# Patient Record
Sex: MALE | Race: WHITE | NOT HISPANIC OR LATINO | Employment: FULL TIME | URBAN - METROPOLITAN AREA
[De-identification: names, ages, dates, MRNs, and addresses within clinical notes are randomized per-mention and may not be internally consistent; named-entity substitution may affect disease eponyms.]

---

## 2017-01-03 DIAGNOSIS — F41.9 ANXIETY: Primary | ICD-10-CM

## 2017-01-03 DIAGNOSIS — F33.1 MAJOR DEPRESSIVE DISORDER, RECURRENT EPISODE, MODERATE (H): ICD-10-CM

## 2017-01-03 RX ORDER — BUPROPION HYDROCHLORIDE 150 MG/1
150 TABLET, EXTENDED RELEASE ORAL 3 TIMES DAILY
Qty: 90 TABLET | Refills: 0 | Status: SHIPPED | OUTPATIENT
Start: 2017-01-03 | End: 2017-01-05

## 2017-01-03 NOTE — TELEPHONE ENCOUNTER
Wellbutrin SR 150mg       Last Written Prescription Date: 5/5/16  Last Fill Quantity: 270; # refills: 0  Last Office Visit with [facility], P or [facility] prescribing provider:  6/26/15        Last PHQ-9 score on record=   PHQ-9 SCORE 3/5/2015   Total Score 1       No results found for this basename: ast  No results found for this basename: alt      Thank You-  Barbara Harris, [facility] [place]

## 2017-01-05 ENCOUNTER — OFFICE VISIT (OUTPATIENT)
Dept: FAMILY MEDICINE | Facility: CLINIC | Age: 32
End: 2017-01-05
Payer: COMMERCIAL

## 2017-01-05 VITALS
BODY MASS INDEX: 28.99 KG/M2 | WEIGHT: 225.9 LBS | DIASTOLIC BLOOD PRESSURE: 88 MMHG | SYSTOLIC BLOOD PRESSURE: 126 MMHG | HEART RATE: 84 BPM

## 2017-01-05 DIAGNOSIS — R07.0 THROAT PAIN: ICD-10-CM

## 2017-01-05 DIAGNOSIS — F33.1 MAJOR DEPRESSIVE DISORDER, RECURRENT EPISODE, MODERATE (H): Primary | ICD-10-CM

## 2017-01-05 DIAGNOSIS — F41.9 ANXIETY: ICD-10-CM

## 2017-01-05 DIAGNOSIS — F98.8 ADD (ATTENTION DEFICIT DISORDER): ICD-10-CM

## 2017-01-05 LAB
DEPRECATED S PYO AG THROAT QL EIA: NORMAL
MICRO REPORT STATUS: NORMAL
SPECIMEN SOURCE: NORMAL

## 2017-01-05 PROCEDURE — 87081 CULTURE SCREEN ONLY: CPT | Mod: 90 | Performed by: FAMILY MEDICINE

## 2017-01-05 PROCEDURE — 87880 STREP A ASSAY W/OPTIC: CPT | Performed by: FAMILY MEDICINE

## 2017-01-05 PROCEDURE — 99000 SPECIMEN HANDLING OFFICE-LAB: CPT | Performed by: FAMILY MEDICINE

## 2017-01-05 PROCEDURE — 99214 OFFICE O/P EST MOD 30 MIN: CPT | Performed by: FAMILY MEDICINE

## 2017-01-05 RX ORDER — DEXTROAMPHETAMINE SACCHARATE, AMPHETAMINE ASPARTATE MONOHYDRATE, DEXTROAMPHETAMINE SULFATE AND AMPHETAMINE SULFATE 5; 5; 5; 5 MG/1; MG/1; MG/1; MG/1
20 CAPSULE, EXTENDED RELEASE ORAL DAILY
Qty: 2 CAPSULE | Refills: 0 | Status: SHIPPED | OUTPATIENT
Start: 2017-01-05 | End: 2018-07-20

## 2017-01-05 RX ORDER — ESCITALOPRAM OXALATE 10 MG/1
10 TABLET ORAL DAILY
Qty: 30 TABLET | Refills: 5 | Status: SHIPPED | OUTPATIENT
Start: 2017-01-05 | End: 2017-10-02

## 2017-01-05 RX ORDER — BUPROPION HYDROCHLORIDE 150 MG/1
150 TABLET, EXTENDED RELEASE ORAL 3 TIMES DAILY
Qty: 90 TABLET | Refills: 5 | Status: SHIPPED | OUTPATIENT
Start: 2017-01-05 | End: 2017-10-02

## 2017-01-05 ASSESSMENT — ANXIETY QUESTIONNAIRES
2. NOT BEING ABLE TO STOP OR CONTROL WORRYING: NOT AT ALL
5. BEING SO RESTLESS THAT IT IS HARD TO SIT STILL: NOT AT ALL
1. FEELING NERVOUS, ANXIOUS, OR ON EDGE: NOT AT ALL
GAD7 TOTAL SCORE: 1
3. WORRYING TOO MUCH ABOUT DIFFERENT THINGS: SEVERAL DAYS
IF YOU CHECKED OFF ANY PROBLEMS ON THIS QUESTIONNAIRE, HOW DIFFICULT HAVE THESE PROBLEMS MADE IT FOR YOU TO DO YOUR WORK, TAKE CARE OF THINGS AT HOME, OR GET ALONG WITH OTHER PEOPLE: NOT DIFFICULT AT ALL
7. FEELING AFRAID AS IF SOMETHING AWFUL MIGHT HAPPEN: NOT AT ALL
6. BECOMING EASILY ANNOYED OR IRRITABLE: NOT AT ALL

## 2017-01-05 ASSESSMENT — PATIENT HEALTH QUESTIONNAIRE - PHQ9: 5. POOR APPETITE OR OVEREATING: NOT AT ALL

## 2017-01-05 NOTE — MR AVS SNAPSHOT
"              After Visit Summary   2017    Evangelist Macario    MRN: [id number]           Patient Information     Date Of Birth          1985        Visit Information        Provider Department      2017 3:20 PM Josefina, KIM Pardo MD [facility]        Today's Diagnoses     Moderate Depression [296.32]    -  1     ADD (attention deficit disorder)         Anxiety         Throat pain            Follow-ups after your visit        Who to contact     If you have questions or need follow up information about today's clinic visit or your schedule please contact [facility] directly at [phone].  Normal or non-critical lab and imaging results will be communicated to you by MyChart, letter or phone within 4 business days after the clinic has received the results. If you do not hear from us within 7 days, please contact the clinic through [employer] or phone. If you have a critical or abnormal lab result, we will notify you by phone as soon as possible.  Submit refill requests through [employer] or call your pharmacy and they will forward the refill request to us. Please allow 3 business days for your refill to be completed.          Additional Information About Your Visit        MyChart Information     [employer] lets you send messages to your doctor, view your test results, renew your prescriptions, schedule appointments and more. To sign up, go to [email or web address] . Click on \"Log in\" on the left side of the screen, which will take you to the Welcome page. Then click on \"Sign up Now\" on the right side of the page.     You will be asked to enter the access code listed below, as well as some personal information. Please follow the directions to create your username and password.     Your access code is: [email or web address]  Expires: 2017  5:31 PM     Your access code will  in 90 days. If you need help or a new code, please call your [facility] or [phone].        Care " EveryWhere ID     This is your Care EveryWhere ID. This could be used by other organizations to access your [facility] medical records  [id number]        Your Vitals Were     Pulse                   84            Blood Pressure from Last 3 Encounters:   01/05/17 126/88   07/05/16 138/84   03/30/16 164/102    Weight from Last 3 Encounters:   01/05/17 225 lb 14.4 oz (102.468 kg)   03/30/16 210 lb (95.255 kg)   06/26/15 198 lb 9.6 oz (90.084 kg)              We Performed the Following     Beta strep group A culture     Rapid strep screen          Today's Medication Changes          These changes are accurate as of: 1/5/17  5:31 PM.  If you have any questions, ask your nurse or doctor.               These medicines have changed or have updated prescriptions.        Dose/Directions    buPROPion 150 MG 12 hr tablet   Commonly known as:  WELLBUTRIN SR   This may have changed:  additional instructions   Used for:  Anxiety, Major depressive disorder, recurrent episode, moderate (H)   Changed by:  KIM Rocha MD        Dose:  150 mg   Take 1 tablet (150 mg) by mouth 3 times daily   Quantity:  90 tablet   Refills:  5       escitalopram 10 MG tablet   Commonly known as:  LEXAPRO   This may have changed:  additional instructions   Used for:  Anxiety, Major depressive disorder, recurrent episode, moderate (H)   Changed by:  KIM Rocha MD        Dose:  10 mg   Take 1 tablet (10 mg) by mouth daily   Quantity:  30 tablet   Refills:  5            Where to get your medicines      These medications were sent to [facility] [place], [place] - [address]   [address] [facility] [address]     Phone:  [phone]    - buPROPion 150 MG 12 hr tablet  - escitalopram 10 MG tablet      Some of these will need a paper prescription and others can be bought over the counter.  Ask your nurse if you have questions.     Bring a paper prescription for each of these medications    -  amphetamine-dextroamphetamine 20 MG per 24 hr capsule             Primary Care Provider Office Phone # Fax #    KIM Rocha [phone]       [address] [address]        Thank you!     Thank you for choosing [facility]  for your care. Our goal is always to provide you with excellent care. Hearing back from our patients is one way we can continue to improve our services. Please take a few minutes to complete the written survey that you may receive in the mail after your visit with us. Thank you!             Your Updated Medication List - Protect others around you: Learn how to safely use, store and throw away your medicines at www.disposemymeds.org.          This list is accurate as of: 1/5/17  5:31 PM.  Always use your most recent med list.                   Brand Name Dispense Instructions for use    ALPRAZolam 0.25 MG tablet    XANAX    15 tablet    Take 1 tablet (0.25 mg) by mouth 3 times daily as needed for anxiety       amphetamine-dextroamphetamine 20 MG per 24 hr capsule    ADDERALL XR    2 capsule    Take 1 capsule (20 mg) by mouth daily On the day of your exam       buPROPion 150 MG 12 hr tablet    WELLBUTRIN SR    90 tablet    Take 1 tablet (150 mg) by mouth 3 times daily       escitalopram 10 MG tablet    LEXAPRO    30 tablet    Take 1 tablet (10 mg) by mouth daily

## 2017-01-05 NOTE — PROGRESS NOTES
SUBJECTIVE:  Evangelist Macario is a 31 year old male who presents with the following concerns;              Symptoms: cc Present Absent Comment   Fever/Chills  X     Fatigue  X     Muscle Aches   X    Eye Irritation   X    Sneezing  X     Nasal Kamar/Drg  X     Sinus Pressure/Pain  X     Loss of smell   X    Dental pain   X    Sore Throat x X   this feels similar to when he has had strep throat before    Swollen Glands   X    Ear Pain/Fullness  X     Cough  X     Wheeze   X    Chest Pain  X  DUE TO COUGHING,SNEEZING    Shortness of breath   X    Rash   X    Other         Symptom duration:  X 4 DAYS   Sympom severity:  NOT GETTING ANY BETTER   Treatments tried:  NONE   Contacts:  DAUGHTERS     Depression follow-up: The medications are working well and he is interested in continuing on the medication the same. Current symptoms include:  PHQ-9 ([employer]) 1/5/2017   No Interest In Doing Things    Feeling Depressed    Trouble Sleeping    Tired / No Energy    No appetite or Over-Eating    Feeling Bad about Self    Trouble Concentrating    Moving Slow or Restless    Suicidal Thoughts    Total Score    1.  Little interest or pleasure in doing things 0   2.  Feeling down, depressed, or hopeless 0   3.  Trouble falling or staying asleep, or sleeping too much 1   4.  Feeling tired or having little energy 1   5.  Poor appetite or overeating 0   6.  Feeling bad about yourself 0   7.  Trouble concentrating 0   8.  Moving slowly or restless 0   9.  Suicidal or self-harm thoughts 0   PHQ-9 Total Score 2   Difficulty at work, home, or with people Not difficult at all     ITA-7   [employer], 2002; Used with Permission) 1/5/2017   Over the last 2 weeks, how often have you been bothered by feeling nervous, anxious or on edge?    Over the last 2 weeks, how often have you been bothered by not being able to stop or control worrying?    Over the last 2 weeks, how often have you been bothered by worrying too much about different things?     Over the last 2 weeks, how often have you been bothered by trouble relaxing?    Over the last 2 weeks, how often have you been bothered by being so restless that it is hard to sit still?    Over the last 2 weeks, how often have you been bothered by becoming easily annoyed or irritable?    Over the last 2 weeks, how often have you been bothered by feeling afraid as if something awful might happen?    ITA-7 Total Score =     1. Feeling nervous, anxious, or on edge 0   2. Not being able to stop or control worrying 0   3. Worrying too much about different things 1   4. Trouble relaxing 0   5. Being so restless that it is hard to sit still 0   6. Becoming easily annoyed or irritable 0   7. Feeling afraid, as if something awful might happen 0   ITA-7 Total Score 1   If you checked any problems, how difficult have they made it for you to do your work, take care of things at home, or get along with other people? Not difficult at all       Attention deficit disorder: He is able to function pretty well with this but has extreme difficulty with test taking. He has an exam coming up and would like to have a refill of just 2 tablets for using with this exam    Medications updated and reviewed.  Past, family and surgical history is updated and reviewed in the record.  ROS:  Other than noted above, general, HEENT, respiratory, cardiac and gastrointestinal systems are negative.  OBJECTIVE:  GENERAL:  Alert, no acute distress  EYES:  PERRL, EOM normal, conjunctiva and lids normal  HEENT: right TM normal, left TM normal, throat/mouth:marked erythema, of the posterior pharynx  NECK:  No adenopathy,masses or thyromegaly.  RESP:  Lungs clear to auscultation.  CV: normal rate, regular rhythm, no murmur or gallop.  PSYCH: mentation appears normal., affect and mood normal  Results for orders placed or performed in visit on 01/05/17   Rapid strep screen   Result Value Ref Range    Specimen Description Throat     Rapid Strep A Screen        NEGATIVE: No Group A streptococcal antigen detected by immunoassay, await   culture report.      Micro Report Status FINAL 01/05/2017         ASSESSMENT:  1. Moderate Depression [296.32]    2. ADD (attention deficit disorder)    3. Anxiety    4. Throat pain        PLAN:  Orders Placed This Encounter     amphetamine-dextroamphetamine (ADDERALL XR) 20 MG per 24 hr capsule     buPROPion (WELLBUTRIN SR) 150 MG 12 hr tablet     escitalopram (LEXAPRO) 10 MG tablet     Symptomatic treatment for the sore throat. Recheck in 6 months

## 2017-01-05 NOTE — NURSING NOTE
"Chief Complaint   Patient presents with     Depression     recheck/refill on medication     URI     4 days.        Initial /88 mmHg  Pulse 84  Wt 225 lb 14.4 oz (102.468 kg) Estimated body mass index is 28.99 kg/(m^2) as calculated from the following:    Height as of 6/26/15: 6' 2\" (1.88 m).    Weight as of this encounter: 225 lb 14.4 oz (102.468 kg).  BP completed using cuff size: luis felipe Figueroa, CMA      "

## 2017-01-06 ENCOUNTER — TELEPHONE (OUTPATIENT)
Dept: FAMILY MEDICINE | Facility: CLINIC | Age: 32
End: 2017-01-06

## 2017-01-06 DIAGNOSIS — F41.9 ANXIETY: Primary | ICD-10-CM

## 2017-01-06 RX ORDER — ALPRAZOLAM 0.25 MG
0.25 TABLET ORAL 3 TIMES DAILY PRN
Qty: 15 TABLET | Refills: 5 | Status: SHIPPED | OUTPATIENT
Start: 2017-01-06 | End: 2017-03-28

## 2017-01-06 ASSESSMENT — PATIENT HEALTH QUESTIONNAIRE - PHQ9: SUM OF ALL RESPONSES TO PHQ QUESTIONS 1-9: 2

## 2017-01-06 ASSESSMENT — ANXIETY QUESTIONNAIRES: GAD7 TOTAL SCORE: 1

## 2017-01-06 NOTE — TELEPHONE ENCOUNTER
Xanax      Last Written Prescription Date:  3/5/2015   Last Fill Quantity: 15,   # refills: 2  Last Office Visit with [facility], [facility] or  Health prescribing provider: 1/5/2017  Future Office visit:       Routing refill request to provider for review/approval because:  Drug not on the [facility], [facility] or  Health refill protocol or controlled substance    Pt was seen yesterday and this medication was not filled.  He is wondering if Dr. Rocha want to fill this for him?

## 2017-01-06 NOTE — TELEPHONE ENCOUNTER
Dr. Rocha,    Patient seen 1/5/17 in office.  Please advise on Xanax refill. Klaudia SOUSA RN    Routing refill request to provider for review/approval because:  Drug not on the [facility] refill protocol Controlled substance

## 2017-01-07 LAB
BACTERIA SPEC CULT: NORMAL
MICRO REPORT STATUS: NORMAL
SPECIMEN SOURCE: NORMAL

## 2017-01-17 ENCOUNTER — ALLIED HEALTH/NURSE VISIT (OUTPATIENT)
Dept: FAMILY MEDICINE | Facility: CLINIC | Age: 32
End: 2017-01-17
Payer: COMMERCIAL

## 2017-01-17 DIAGNOSIS — Z23 NEED FOR PROPHYLACTIC VACCINATION AND INOCULATION AGAINST INFLUENZA: Primary | ICD-10-CM

## 2017-01-17 PROCEDURE — 90471 IMMUNIZATION ADMIN: CPT

## 2017-01-17 PROCEDURE — 99207 ZZC NO CHARGE NURSE ONLY: CPT

## 2017-01-17 PROCEDURE — 90686 IIV4 VACC NO PRSV 0.5 ML IM: CPT

## 2017-01-17 NOTE — PROGRESS NOTES
Injectable Influenza Immunization Documentation    1.  Is the person to be vaccinated sick today?  No    2. Does the person to be vaccinated have an allergy to eggs or to a component of the vaccine?  No    3. Has the person to be vaccinated today ever had a serious reaction to influenza vaccine in the past?  No    4. Has the person to be vaccinated ever had Guillain-Bancroft syndrome?  No     Form completed by Priscilla Clark MA  4:02 PM 1/17/2017

## 2017-03-28 DIAGNOSIS — F41.9 ANXIETY: ICD-10-CM

## 2017-03-28 RX ORDER — ALPRAZOLAM 0.25 MG
0.25 TABLET ORAL 3 TIMES DAILY PRN
Qty: 15 TABLET | Refills: 5 | Status: SHIPPED | OUTPATIENT
Start: 2017-03-28 | End: 2017-05-15

## 2017-03-28 NOTE — TELEPHONE ENCOUNTER
Alprazolam 0.25mg      Last Written Prescription Date:  1/6/17  Last Fill Quantity: 15,   # refills: 5  Last Office Visit with [facility], [facility] or [facility] prescribing provider: 1/5/17  Future Office visit:       Routing refill request to provider for review/approval because:  Drug not on the [facility], [facility] or [facility] refill protocol or controlled substance    Thank You-  Barbara Harris [facility] [place]

## 2017-05-15 DIAGNOSIS — F41.9 ANXIETY: ICD-10-CM

## 2017-05-15 RX ORDER — ALPRAZOLAM 0.25 MG
0.25 TABLET ORAL 3 TIMES DAILY PRN
Qty: 15 TABLET | Refills: 5 | Status: SHIPPED | OUTPATIENT
Start: 2017-05-15 | End: 2017-07-24

## 2017-05-15 NOTE — TELEPHONE ENCOUNTER
Alprazolam 0.25 mg tablet      Last Written Prescription Date:  3/28/17   Last Fill Quantity: 15,   # refills: 5  Last Office Visit with [facility], [facility] or  Health prescribing provider: 1/5/17  Future Office visit:       Routing refill request to provider for review/approval because:  Drug not on the [facility], [facility] or [facility] refill protocol or controlled substance    Alyssa Spicer, PharmD   [facility]  [phone]

## 2017-07-17 ENCOUNTER — OFFICE VISIT (OUTPATIENT)
Dept: FAMILY MEDICINE | Facility: CLINIC | Age: 32
End: 2017-07-17
Payer: COMMERCIAL

## 2017-07-17 VITALS
TEMPERATURE: 97.9 F | HEART RATE: 96 BPM | SYSTOLIC BLOOD PRESSURE: 136 MMHG | WEIGHT: 217 LBS | BODY MASS INDEX: 28.76 KG/M2 | HEIGHT: 73 IN | DIASTOLIC BLOOD PRESSURE: 86 MMHG

## 2017-07-17 DIAGNOSIS — T63.441A BEE STING REACTION, ACCIDENTAL OR UNINTENTIONAL, INITIAL ENCOUNTER: Primary | ICD-10-CM

## 2017-07-17 PROCEDURE — 99213 OFFICE O/P EST LOW 20 MIN: CPT | Performed by: NURSE PRACTITIONER

## 2017-07-17 RX ORDER — PREDNISONE 20 MG/1
20 TABLET ORAL DAILY
Qty: 5 TABLET | Refills: 0 | Status: SHIPPED | OUTPATIENT
Start: 2017-07-17 | End: 2017-10-02

## 2017-07-17 NOTE — NURSING NOTE
"Chief Complaint   Patient presents with     Insect Bites     Bee sting to the right hand yesterday.       Initial BP (!) 143/94  Pulse 96  Temp 97.9  F (36.6  C) (Tympanic)  Ht 6' 1.25\" (1.861 m)  Wt 217 lb (98.4 kg)  BMI 28.43 kg/m2 Estimated body mass index is 28.43 kg/(m^2) as calculated from the following:    Height as of this encounter: 6' 1.25\" (1.861 m).    Weight as of this encounter: 217 lb (98.4 kg).  Medication Reconciliation: complete  "

## 2017-07-17 NOTE — PATIENT INSTRUCTIONS
Thank you for choosing [facility].  You may be receiving a survey in the mail from Grisel Fonseca regarding your visit today.  Please take a few minutes to complete and return the survey to let us know how we are doing.      If you have questions or concerns, please contact us via [employer] or you can contact your care team at [phone].    Our Clinic hours are:  Monday 6:40 am  to 7:00 pm  Tuesday -Friday 6:40 am to 5:00 pm    The [place] outpatient lab hours are:  Monday - Friday 6:10 am to 4:45 pm  Saturdays 7:00 am to 11:00 am  Appointments are required, call [phone]    If you have clinical questions after hours or would like to schedule an appointment,  call the clinic at [phone].

## 2017-07-17 NOTE — MR AVS SNAPSHOT
After Visit Summary   7/17/2017    Evangelist Macario    MRN: [id number]           Patient Information     Date Of Birth          1985        Visit Information        Provider Department      7/17/2017 2:40 PM Chikis Sinha APRN [facility]        Today's Diagnoses     Bee sting reaction, accidental or unintentional, initial encounter    -  1      Care Instructions          Thank you for choosing [facility].  You may be receiving a survey in the mail from Grisel Fonseca regarding your visit today.  Please take a few minutes to complete and return the survey to let us know how we are doing.      If you have questions or concerns, please contact us via [employer] or you can contact your care team at [phone].    Our Clinic hours are:  Monday 6:40 am  to 7:00 pm  Tuesday -Friday 6:40 am to 5:00 pm    The [place] outpatient lab hours are:  Monday - Friday 6:10 am to 4:45 pm  Saturdays 7:00 am to 11:00 am  Appointments are required, call [phone]    If you have clinical questions after hours or would like to schedule an appointment,  call the clinic at [phone].            Follow-ups after your visit        Who to contact     If you have questions or need follow up information about today's clinic visit or your schedule please contact [facility] directly at [phone].  Normal or non-critical lab and imaging results will be communicated to you by [employer], letter or phone within 4 business days after the clinic has received the results. If you do not hear from us within 7 days, please contact the clinic through [employer] or phone. If you have a critical or abnormal lab result, we will notify you by phone as soon as possible.  Submit refill requests through [employer] or call your pharmacy and they will forward the refill request to us. Please allow 3 business days for your refill to be completed.          Additional Information About Your Visit       "  MyChart Information     [employer] lets you send messages to your doctor, view your test results, renew your prescriptions, schedule appointments and more. To sign up, go to [email or web address] . Click on \"Log in\" on the left side of the screen, which will take you to the Welcome page. Then click on \"Sign up Now\" on the right side of the page.     You will be asked to enter the access code listed below, as well as some personal information. Please follow the directions to create your username and password.     Your access code is: [email or web address]  Expires: 10/15/2017  3:38 PM     Your access code will  in 90 days. If you need help or a new code, please call your [place] clinic or [phone].        Care EveryWhere ID     This is your Care EveryWhere ID. This could be used by other organizations to access your [facility] medical records  [id number]        Your Vitals Were     Pulse Temperature Height BMI (Body Mass Index)          96 97.9  F (36.6  C) (Tympanic) 6' 1.25\" (1.861 m) 28.43 kg/m2         Blood Pressure from Last 3 Encounters:   17 136/86   17 126/88   16 138/84    Weight from Last 3 Encounters:   17 217 lb (98.4 kg)   17 225 lb 14.4 oz (102.5 kg)   16 210 lb (95.3 kg)              Today, you had the following     No orders found for display         Today's Medication Changes          These changes are accurate as of: 17  3:38 PM.  If you have any questions, ask your nurse or doctor.               Start taking these medicines.        Dose/Directions    predniSONE 20 MG tablet   Commonly known as:  DELTASONE   Used for:  Bee sting reaction, accidental or unintentional, initial encounter   Started by:  Chikis Sinha APRN CNP        Dose:  20 mg   Take 1 tablet (20 mg) by mouth daily   Quantity:  5 tablet   Refills:  0            Where to get your medicines      These medications were sent to [facility], [place] - " [address] [place] [address]  [address] [place] [address]     Phone:  [phone]     predniSONE 20 MG tablet                Primary Care Provider Office Phone # Fax #    R Edvin Rocha [phone]       [facility] [address] [facility] [address]        Equal Access to Services     ROHANDESEAN MICHA : Hadii aad ku hadasho Soomaali, waaxda luqadaha, qaybta kaalmada adeegyada, waxay idiin hayaan adeeg khcherridai lasrinath ah. So [facility] [phone].    ATENCIÓN: Si habla español, tiene a stevens disposición servicios gratuitos de asistencia lingüística. [facility] [phone].    We comply with applicable federal civil rights laws and [place] laws. We do not discriminate on the basis of race, color, national origin, age, disability sex, sexual orientation or gender identity.            Thank you!     Thank you for choosing [facility]  for your care. Our goal is always to provide you with excellent care. Hearing back from our patients is one way we can continue to improve our services. Please take a few minutes to complete the written survey that you may receive in the mail after your visit with us. Thank you!             Your Updated Medication List - Protect others around you: Learn how to safely use, store and throw away your medicines at www.disposemymeds.org.          This list is accurate as of: 7/17/17  3:38 PM.  Always use your most recent med list.                   Brand Name Dispense Instructions for use Diagnosis    ALPRAZolam 0.25 MG tablet    XANAX    15 tablet    Take 1 tablet (0.25 mg) by mouth 3 times daily as needed for anxiety    Anxiety       amphetamine-dextroamphetamine 20 MG per 24 hr capsule    ADDERALL XR    2 capsule    Take 1 capsule (20 mg) by mouth daily On the day of your exam    ADD (attention deficit disorder)       buPROPion 150 MG 12 hr tablet    WELLBUTRIN SR    90 tablet    Take 1 tablet (150 mg) by mouth 3 times daily    Anxiety, Major  depressive disorder, recurrent episode, moderate (H)       escitalopram 10 MG tablet    LEXAPRO    30 tablet    Take 1 tablet (10 mg) by mouth daily    Anxiety, Major depressive disorder, recurrent episode, moderate (H)       predniSONE 20 MG tablet    DELTASONE    5 tablet    Take 1 tablet (20 mg) by mouth daily    Bee sting reaction, accidental or unintentional, initial encounter

## 2017-07-17 NOTE — PROGRESS NOTES
"  SUBJECTIVE:                                                    Evangelist Macario is a 31 year old male who presents to clinic today for the following health issues:      BEE STING      Duration: Happened yesterday    Description (location/character/radiation): Bee sting to the right hand, ring finger.  Swelling of the finger at first, now radiating to the hand.    Intensity:  Moderate - was more swollen and tight yesterday, seems better today    Accompanying signs and symptoms: From the palm of the hand to the elbow-tight feeling.    No swelling in mouth/tongue.    No throat symptoms.    No shortness of breath.    History (similar episodes/previous evaluation): Has been stung in the past and has not had swelling.    Precipitating or alleviating factors: None    Therapies tried and outcome: Benadryl yesterday.           Problem list and histories reviewed & adjusted, as indicated.  Additional history: as documented      Reviewed and updated as needed this visit by clinical staff  Tobacco  Allergies  Med Hx  Surg Hx  Fam Hx  Soc Hx      Reviewed and updated as needed this visit by Provider         ROS:  Constitutional, HEENT, cardiovascular, pulmonary, gi and gu systems are negative, except as otherwise noted.    OBJECTIVE:     /86  Pulse 96  Temp 97.9  F (36.6  C) (Tympanic)  Ht 6' 1.25\" (1.861 m)  Wt 217 lb (98.4 kg)  BMI 28.43 kg/m2  Body mass index is 28.43 kg/(m^2).  GENERAL: healthy, alert and no distress  MS: right upper extremity - minimally edema back of hand. No erythema. No tenderness to touch. ROM normal. Sensation intact.      ASSESSMENT/PLAN:       ICD-10-CM    1. Bee sting reaction, accidental or unintentional, initial encounter T63.441A predniSONE (DELTASONE) 20 MG tablet     Mild reaction.  Patient reports more swelling yesterday.  Today mostly concerned about tingling feeling in forearm and elbow.    The risks, benefits and treatment options of prescribed medications or other " treatments have been discussed with the patient. The patient verbalized their understanding and should call or follow up if no improvement or if they develop further problems.    CHESTER Renteria [facility]

## 2017-07-24 DIAGNOSIS — F41.9 ANXIETY: ICD-10-CM

## 2017-07-24 NOTE — TELEPHONE ENCOUNTER
Xanax 0.25mg      Last Written Prescription Date: 5/15/17  Last Fill Quantity: 15,  # refills: 5   Last Office Visit with G, P or [facility] prescribing provider: 1/5/17    Annamaria Jauregui [facility]

## 2017-07-25 RX ORDER — ALPRAZOLAM 0.25 MG
0.25 TABLET ORAL 3 TIMES DAILY PRN
Qty: 15 TABLET | Refills: 5 | Status: SHIPPED | OUTPATIENT
Start: 2017-07-25 | End: 2017-09-14

## 2017-08-29 ENCOUNTER — OFFICE VISIT (OUTPATIENT)
Dept: URGENT CARE | Facility: URGENT CARE | Age: 32
End: 2017-08-29
Payer: COMMERCIAL

## 2017-08-29 VITALS
DIASTOLIC BLOOD PRESSURE: 96 MMHG | HEART RATE: 91 BPM | SYSTOLIC BLOOD PRESSURE: 151 MMHG | BODY MASS INDEX: 28.72 KG/M2 | TEMPERATURE: 97.9 F | WEIGHT: 219.2 LBS | OXYGEN SATURATION: 98 %

## 2017-08-29 DIAGNOSIS — M62.830 SPASM OF BACK MUSCLES: ICD-10-CM

## 2017-08-29 DIAGNOSIS — S33.5XXA SPRAIN OF LUMBAR REGION, INITIAL ENCOUNTER: Primary | ICD-10-CM

## 2017-08-29 PROCEDURE — 99214 OFFICE O/P EST MOD 30 MIN: CPT | Performed by: NURSE PRACTITIONER

## 2017-08-29 RX ORDER — HYDROCODONE BITARTRATE AND ACETAMINOPHEN 5; 325 MG/1; MG/1
1 TABLET ORAL EVERY 6 HOURS PRN
Qty: 20 TABLET | Refills: 0 | Status: SHIPPED | OUTPATIENT
Start: 2017-08-29 | End: 2017-10-02

## 2017-08-29 RX ORDER — CYCLOBENZAPRINE HCL 10 MG
5-10 TABLET ORAL 3 TIMES DAILY PRN
Qty: 30 TABLET | Refills: 1 | Status: SHIPPED | OUTPATIENT
Start: 2017-08-29 | End: 2017-10-02

## 2017-08-29 NOTE — LETTER
[facility]  [address] [address]  Phone: [phone]  Fax: [phone]    August 29, 2017        Evangelist Macario  [address] [facility] [address]          To whom it may concern:    RE: Evangelist Macario    Patient was seen and treated today at our clinic.  Patient may return to work  with the following:  Light duty-unable to lift more than 10   pounds for the next 2 weeks.  If need further work restrictions will need to be re evaluated.     Please contact me for questions or concerns.      Sincerely,        CHESTER Tristan CNP

## 2017-08-29 NOTE — MR AVS SNAPSHOT
After Visit Summary   8/29/2017    Evangelist Macario    MRN: [id number]           Patient Information     Date Of Birth          1985        Visit Information        Provider Department      8/29/2017 5:20 PM Lauren Gilliam APRN CNP [facility]        Today's Diagnoses     Sprain of lumbar region, initial encounter    -  1    Spasm of back muscles          Care Instructions    Heat or ice may help.   Ibuprofen up to 800 mg four times daily, aleve 2 pills twice daily or acetaminophen 2 pills four times daily may help.   Stronger pain pills can be used if needed. Take muscle relaxant, Flexeril (cyclobenzaprine) up to 3 times a day as needed for pain.   OK to take the Take hydrocodone/acetaminophen 1 or 2 pills every 6 hours as needed for pain.   Recommend physical therapy if not improving.  Will need to follow-up with your primary care provider for a referral if not improving,   Recheck in 2-3 weeks if not improving or other problems.   Staying active will help. Those who continue work and daily activities get better faster. Avoid painful activities.           Back Sprain or Strain    Injury to the muscles (strain) or ligaments (sprain) around the spine can be troubling. Injury may occur after a sudden forceful twisting or bending force such as in a car accident, after a simple awkward movement, or after lifting something heavy with poor body positioning. In any case, muscle spasm is often present and adds to the pain.  Thankfully, most people feel better in 1 to 2 weeks, and most of the rest in 1 to 2 months. Most people can remain active. Unless you had a forceful or traumatic physical injury such as a car accident or fall, X-rays may not be ordered for the first evaluation of a back sprain or strain. If pain continues and does not respond to medical treatment, your healthcare provider may then order X-rays and other tests.  Home care  The following guidelines  will help you care for your injury at home:    When in bed, try to find a comfortable position. A firm mattress is best. Try lying flat on your back with pillows under your knees. You can also try lying on your side with your knees bent up toward your chest and a pillow between your knees.    Don't sit for long periods. Try not to take long car rides or take other trips that have you sitting for a long time. This puts more stress on the lower back than standing or walking.    During the first 24 to 72 hours after an injury or flare-up, apply an ice pack to the painful area for 20 minutes. Then remove it for 20 minutes. Do this for 60 to 90 minutes, or several times a day. This will reduce swelling and pain. Be sure to wrap the ice pack in a thin towel or plastic to protect your skin.    You can start with ice, then switch to heat. Heat from a hot shower, hot bath, or heating pad reduces pain and works well for muscle spasms. Put heat on the painful area for 20 minutes, then remove for 20 minutes. Do this for 60 to 90 minutes, or several times a day. Do not use a heating pad while sleeping. It can burn the skin.    You can alternate the ice and heat. Talk with your healthcare provider to find out the best treatment or therapy for your back pain.    Therapeutic massage will help relax the back muscles without stretching them.    Be aware of safe lifting methods. Do not lift anything over 15 pounds until all of the pain is gone.  Medicines  Talk to your healthcare provider before using medicines, especially if you have other health problems or are taking other medicines.    You may use acetaminophen or ibuprofen to control pain, unless another pain medicine was prescribed. If you have chronic conditions like diabetes, liver or kidney disease, stomach ulcers, or gastrointestinal bleeding, or are taking blood-thinner medicines, talk with your doctor before taking any medicines.    Be careful if you are given prescription  medicines, narcotics, or medicine for muscle spasm. They can cause drowsiness, and affect your coordination, reflexes, and judgment. Do not drive or operate heavy machinery when taking these types of medicines. Only take pain medicine as prescribed by your healthcare provider.  Follow-up care  Follow up with your healthcare provider, or as advised. You may need physical therapy or more tests if your symptoms get worse.  If you had X-rays your healthcare provider may be checking for any broken bones, breaks, or fractures. Bruises and sprains can sometimes hurt as much as a fracture. These injuries can take time to heal completely. If your symptoms don t improve or they get worse, talk with your healthcare provider. You may need a repeat X-ray or other tests.  Call 911  Call for emergency care if any of the following occur:    Trouble breathing    Confused    Very drowsy or trouble awakening    Fainting or loss of consciousness    Rapid or very slow heart rate    Loss of bowel or bladder control  When to seek medical advice  Call your healthcare provider right away if any of the following occur:    Pain gets worse or spreads to your arms or legs    Weakness or numbness in one or both arms or legs    Numbness in the groin or genital area  Date Last Reviewed: 6/1/2016 2000-2017 [employer]. [address] [place] [address]. All rights reserved. This information is not intended as a substitute for professional medical care. Always follow your healthcare professional's instructions.        Back Spasm (No Trauma)    Spasm of the back muscles can occur after a sudden forceful twisting or bending force (such as in a car accident), after a simple awkward movement, or after lifting something heavy with poor body positioning. In any case, muscle spasm adds to the pain. Sleeping in an awkward position or on a poor quality mattress can also cause this. Some people respond to emotional stress by tensing  the muscles of their back.  Pain that continues may need further evaluation or other types of treatment such as physical therapy.  You don't always need X-rays for the initial evaluation of back pain, unless you had a physical injury such as from a car accident or fall. If your pain continues and doesn't respond to medical treatment, X-rays and other tests may then be done.   Home care    As soon as possible, start sitting or walking again to avoid problems from prolonged bed rest (muscle weakness, worsening back stiffness and pain, blood clots in the legs).    When in bed, try to find a position of comfort. A firm mattress is best. Try lying flat on your back with pillows under your knees. You can also try lying on your side with your knees bent up toward your chest and a pillow between your knees.    Avoid prolonged sitting, long car rides, or travel. This puts more stress on the lower back than standing or walking.     During the first 24 to 72 hours after an injury or flare-up, apply an ice pack to the painful area for 20 minutes, then remove it for 20 minutes. Do this over a period of 60 to 90 minutes or several times a day. This will reduce swelling and pain. Always wrap ice packs in a thin towel.    You can start with ice, then switch to heat. Heat (hot shower, hot bath, or heating pad) reduces pain, and works well for muscle spasms. Apply heat to the painful area for 20 minutes, then remove it for 20 minutes. Do this over a period of 60 to 90 minutes or several times a day. Do not sleep on a heating pad as it can burn or damage skin.    Alternate ice and heat therapies.    Be aware of safe lifting methods and do not lift anything over 15 pounds until all the pain is gone.  Gentle stretching will help your back heal faster. Do this simple routine 2 to 3 times a day until your back is feeling better.    Lie on your back with your knees bent and both feet on the ground    Slowly raise your left knee to your  chest as you flatten your lower back against the floor. Hold for 20 to 30 seconds.    Relax and repeat the exercise with your right knee.    Do 2 to 3 of these exercises for each leg.    Repeat, hugging both knees to your chest at the same time.    Do not bounce, but use a gentle pull.  Medicines  Talk to your doctor before using medicine, especially if you have other medical problems or are taking other medicines.  You may use acetaminophen or ibuprofen to control pain, unless your healthcare provider prescribed another pain medicine. If you have a chronic condition such as diabetes, liver or kidney disease, stomach ulcer, or gastrointestinal bleeding, or are taking blood thinners, talk with your healthcare provider before taking any medicines.  Be careful if you are given prescription pain medicine, narcotics, or medicine for muscle spasm. They can cause drowsiness, affect your coordination, reflexes, or judgment. Do not drive or operate heavy machinery when taking these medicines. Take pain medicine only as prescribed by your healthcare provider.  Follow-up care  Follow up with your doctor, or as advised. Physical therapy or further tests may be needed.  If X-rays were taken, they may be reviewed by a radiologist. You will be notified of any new findings that may affect your care.  Call 911  Seek emergency medical care if any of these occur:    Trouble breathing    Confusion    Drowsiness or trouble awakening    Fainting or loss of consciousness    Rapid or very slow heart rate    Loss of bowel or bladder control  When to seek medical advice  Call your healthcare provider right away if any of these occur:    Pain becomes worse or spreads to your legs    Weakness or numbness in one or both legs    Numbness in the groin or genital area    Unexplained fever over 100.4 F (38.0 C)    Burning or pain when passing urine  Date Last Reviewed: 6/1/2016 2000-2017 [employer]. [address] [facility],  "[employer] [place] [address]. All rights reserved. This information is not intended as a substitute for professional medical care. Always follow your healthcare professional's instructions.                Follow-ups after your visit        Who to contact     If you have questions or need follow up information about today's clinic visit or your schedule please contact [facility] directly at [phone].  Normal or non-critical lab and imaging results will be communicated to you by MyChart, letter or phone within 4 business days after the clinic has received the results. If you do not hear from us within 7 days, please contact the clinic through [employer] or phone. If you have a critical or abnormal lab result, we will notify you by phone as soon as possible.  Submit refill requests through [employer] or call your pharmacy and they will forward the refill request to us. Please allow 3 business days for your refill to be completed.          Additional Information About Your Visit        MyChart Information     [employer] lets you send messages to your doctor, view your test results, renew your prescriptions, schedule appointments and more. To sign up, go to [email or web address] . Click on \"Log in\" on the left side of the screen, which will take you to the Welcome page. Then click on \"Sign up Now\" on the right side of the page.     You will be asked to enter the access code listed below, as well as some personal information. Please follow the directions to create your username and password.     Your access code is: [email or web address]  Expires: 10/15/2017  3:38 PM     Your access code will  in 90 days. If you need help or a new code, please call your [place] clinic or [phone].        Care EveryWhere ID     This is your Care EveryWhere ID. This could be used by other organizations to access your [place] medical records  [id number]        Your Vitals Were     Pulse Temperature Pulse Oximetry BMI " (Body Mass Index)          91 97.9  F (36.6  C) (Tympanic) 98% 28.72 kg/m2         Blood Pressure from Last 3 Encounters:   08/29/17 (!) 151/96   07/17/17 136/86   01/05/17 126/88    Weight from Last 3 Encounters:   08/29/17 219 lb 3.2 oz (99.4 kg)   07/17/17 217 lb (98.4 kg)   01/05/17 225 lb 14.4 oz (102.5 kg)              Today, you had the following     No orders found for display         Today's Medication Changes          These changes are accurate as of: 8/29/17  5:40 PM.  If you have any questions, ask your nurse or doctor.               Start taking these medicines.        Dose/Directions    cyclobenzaprine 10 MG tablet   Commonly known as:  FLEXERIL   Used for:  Spasm of back muscles   Started by:  Lauren Gilliam APRN CNP        Dose:  5-10 mg   Take 0.5-1 tablets (5-10 mg) by mouth 3 times daily as needed for muscle spasms   Quantity:  30 tablet   Refills:  1       HYDROcodone-acetaminophen 5-325 MG per tablet   Commonly known as:  NORCO   Used for:  Sprain of lumbar region, initial encounter   Started by:  Lauren Gilliam APRN CNP        Dose:  1 tablet   Take 1 tablet by mouth every 6 hours as needed for moderate to severe pain maximum 6 tablet(s) per day   Quantity:  20 tablet   Refills:  0            Where to get your medicines      These medications were sent to [id number] [address] [facility]  [address] [facility] [address]    Hours:  Closed 10-16-08 business to [facility] Phone:  [phone]     cyclobenzaprine 10 MG tablet         Some of these will need a paper prescription and others can be bought over the counter.  Ask your nurse if you have questions.     Bring a paper prescription for each of these medications     HYDROcodone-acetaminophen 5-325 MG per tablet                Primary Care Provider Office Phone # Fax #    KIM Rocha [phone] [address] [facility] [address]        Equal Access to Services      [facility]: Hadii aad ku vito Tirado, waaxda luqadaha, qaybta kaalmada adeegamaury, jessica odilonin hayaajusta galandevyn castrejon luann . So [facility] [phone].    ATENCIÓN: Si habla zaina, tiene a stevens disposición servicios gratuitos de asistencia lingüística. Llame al [phone].    We comply with applicable federal civil rights laws and [place] laws. We do not discriminate on the basis of race, color, national origin, age, disability sex, sexual orientation or gender identity.            Thank you!     Thank you for choosing [facility]  for your care. Our goal is always to provide you with excellent care. Hearing back from our patients is one way we can continue to improve our services. Please take a few minutes to complete the written survey that you may receive in the mail after your visit with us. Thank you!             Your Updated Medication List - Protect others around you: Learn how to safely use, store and throw away your medicines at www.disposemymeds.org.          This list is accurate as of: 8/29/17  5:40 PM.  Always use your most recent med list.                   Brand Name Dispense Instructions for use Diagnosis    ALPRAZolam 0.25 MG tablet    XANAX    15 tablet    Take 1 tablet (0.25 mg) by mouth 3 times daily as needed for anxiety    Anxiety       amphetamine-dextroamphetamine 20 MG per 24 hr capsule    ADDERALL XR    2 capsule    Take 1 capsule (20 mg) by mouth daily On the day of your exam    ADD (attention deficit disorder)       buPROPion 150 MG 12 hr tablet    WELLBUTRIN SR    90 tablet    Take 1 tablet (150 mg) by mouth 3 times daily    Anxiety, Major depressive disorder, recurrent episode, moderate (H)       cyclobenzaprine 10 MG tablet    FLEXERIL    30 tablet    Take 0.5-1 tablets (5-10 mg) by mouth 3 times daily as needed for muscle spasms    Spasm of back muscles       escitalopram 10 MG tablet    LEXAPRO    30 tablet    Take 1 tablet (10 mg) by mouth  daily    Anxiety, Major depressive disorder, recurrent episode, moderate (H)       HYDROcodone-acetaminophen 5-325 MG per tablet    NORCO    20 tablet    Take 1 tablet by mouth every 6 hours as needed for moderate to severe pain maximum 6 tablet(s) per day    Sprain of lumbar region, initial encounter       predniSONE 20 MG tablet    DELTASONE    5 tablet    Take 1 tablet (20 mg) by mouth daily    Bee sting reaction, accidental or unintentional, initial encounter

## 2017-08-29 NOTE — PATIENT INSTRUCTIONS
Heat or ice may help.   Ibuprofen up to 800 mg four times daily, aleve 2 pills twice daily or acetaminophen 2 pills four times daily may help.   Stronger pain pills can be used if needed. Take muscle relaxant, Flexeril (cyclobenzaprine) up to 3 times a day as needed for pain.   OK to take the Take hydrocodone/acetaminophen 1 or 2 pills every 6 hours as needed for pain.   Recommend physical therapy if not improving.  Will need to follow-up with your primary care provider for a referral if not improving,   Recheck in 2-3 weeks if not improving or other problems.   Staying active will help. Those who continue work and daily activities get better faster. Avoid painful activities.           Back Sprain or Strain    Injury to the muscles (strain) or ligaments (sprain) around the spine can be troubling. Injury may occur after a sudden forceful twisting or bending force such as in a car accident, after a simple awkward movement, or after lifting something heavy with poor body positioning. In any case, muscle spasm is often present and adds to the pain.  Thankfully, most people feel better in 1 to 2 weeks, and most of the rest in 1 to 2 months. Most people can remain active. Unless you had a forceful or traumatic physical injury such as a car accident or fall, X-rays may not be ordered for the first evaluation of a back sprain or strain. If pain continues and does not respond to medical treatment, your healthcare provider may then order X-rays and other tests.  Home care  The following guidelines will help you care for your injury at home:    When in bed, try to find a comfortable position. A firm mattress is best. Try lying flat on your back with pillows under your knees. You can also try lying on your side with your knees bent up toward your chest and a pillow between your knees.    Don't sit for long periods. Try not to take long car rides or take other trips that have you sitting for a long time. This puts more stress on  the lower back than standing or walking.    During the first 24 to 72 hours after an injury or flare-up, apply an ice pack to the painful area for 20 minutes. Then remove it for 20 minutes. Do this for 60 to 90 minutes, or several times a day. This will reduce swelling and pain. Be sure to wrap the ice pack in a thin towel or plastic to protect your skin.    You can start with ice, then switch to heat. Heat from a hot shower, hot bath, or heating pad reduces pain and works well for muscle spasms. Put heat on the painful area for 20 minutes, then remove for 20 minutes. Do this for 60 to 90 minutes, or several times a day. Do not use a heating pad while sleeping. It can burn the skin.    You can alternate the ice and heat. Talk with your healthcare provider to find out the best treatment or therapy for your back pain.    Therapeutic massage will help relax the back muscles without stretching them.    Be aware of safe lifting methods. Do not lift anything over 15 pounds until all of the pain is gone.  Medicines  Talk to your healthcare provider before using medicines, especially if you have other health problems or are taking other medicines.    You may use acetaminophen or ibuprofen to control pain, unless another pain medicine was prescribed. If you have chronic conditions like diabetes, liver or kidney disease, stomach ulcers, or gastrointestinal bleeding, or are taking blood-thinner medicines, talk with your doctor before taking any medicines.    Be careful if you are given prescription medicines, narcotics, or medicine for muscle spasm. They can cause drowsiness, and affect your coordination, reflexes, and judgment. Do not drive or operate heavy machinery when taking these types of medicines. Only take pain medicine as prescribed by your healthcare provider.  Follow-up care  Follow up with your healthcare provider, or as advised. You may need physical therapy or more tests if your symptoms get worse.  If you had  X-rays your healthcare provider may be checking for any broken bones, breaks, or fractures. Bruises and sprains can sometimes hurt as much as a fracture. These injuries can take time to heal completely. If your symptoms don t improve or they get worse, talk with your healthcare provider. You may need a repeat X-ray or other tests.  Call 911  Call for emergency care if any of the following occur:    Trouble breathing    Confused    Very drowsy or trouble awakening    Fainting or loss of consciousness    Rapid or very slow heart rate    Loss of bowel or bladder control  When to seek medical advice  Call your healthcare provider right away if any of the following occur:    Pain gets worse or spreads to your arms or legs    Weakness or numbness in one or both arms or legs    Numbness in the groin or genital area  Date Last Reviewed: 6/1/2016 2000-2017 [employer]. [address] [place] [address]. All rights reserved. This information is not intended as a substitute for professional medical care. Always follow your healthcare professional's instructions.        Back Spasm (No Trauma)    Spasm of the back muscles can occur after a sudden forceful twisting or bending force (such as in a car accident), after a simple awkward movement, or after lifting something heavy with poor body positioning. In any case, muscle spasm adds to the pain. Sleeping in an awkward position or on a poor quality mattress can also cause this. Some people respond to emotional stress by tensing the muscles of their back.  Pain that continues may need further evaluation or other types of treatment such as physical therapy.  You don't always need X-rays for the initial evaluation of back pain, unless you had a physical injury such as from a car accident or fall. If your pain continues and doesn't respond to medical treatment, X-rays and other tests may then be done.   Home care    As soon as possible, start sitting or  walking again to avoid problems from prolonged bed rest (muscle weakness, worsening back stiffness and pain, blood clots in the legs).    When in bed, try to find a position of comfort. A firm mattress is best. Try lying flat on your back with pillows under your knees. You can also try lying on your side with your knees bent up toward your chest and a pillow between your knees.    Avoid prolonged sitting, long car rides, or travel. This puts more stress on the lower back than standing or walking.     During the first 24 to 72 hours after an injury or flare-up, apply an ice pack to the painful area for 20 minutes, then remove it for 20 minutes. Do this over a period of 60 to 90 minutes or several times a day. This will reduce swelling and pain. Always wrap ice packs in a thin towel.    You can start with ice, then switch to heat. Heat (hot shower, hot bath, or heating pad) reduces pain, and works well for muscle spasms. Apply heat to the painful area for 20 minutes, then remove it for 20 minutes. Do this over a period of 60 to 90 minutes or several times a day. Do not sleep on a heating pad as it can burn or damage skin.    Alternate ice and heat therapies.    Be aware of safe lifting methods and do not lift anything over 15 pounds until all the pain is gone.  Gentle stretching will help your back heal faster. Do this simple routine 2 to 3 times a day until your back is feeling better.    Lie on your back with your knees bent and both feet on the ground    Slowly raise your left knee to your chest as you flatten your lower back against the floor. Hold for 20 to 30 seconds.    Relax and repeat the exercise with your right knee.    Do 2 to 3 of these exercises for each leg.    Repeat, hugging both knees to your chest at the same time.    Do not bounce, but use a gentle pull.  Medicines  Talk to your doctor before using medicine, especially if you have other medical problems or are taking other medicines.  You may use  acetaminophen or ibuprofen to control pain, unless your healthcare provider prescribed another pain medicine. If you have a chronic condition such as diabetes, liver or kidney disease, stomach ulcer, or gastrointestinal bleeding, or are taking blood thinners, talk with your healthcare provider before taking any medicines.  Be careful if you are given prescription pain medicine, narcotics, or medicine for muscle spasm. They can cause drowsiness, affect your coordination, reflexes, or judgment. Do not drive or operate heavy machinery when taking these medicines. Take pain medicine only as prescribed by your healthcare provider.  Follow-up care  Follow up with your doctor, or as advised. Physical therapy or further tests may be needed.  If X-rays were taken, they may be reviewed by a radiologist. You will be notified of any new findings that may affect your care.  Call 911  Seek emergency medical care if any of these occur:    Trouble breathing    Confusion    Drowsiness or trouble awakening    Fainting or loss of consciousness    Rapid or very slow heart rate    Loss of bowel or bladder control  When to seek medical advice  Call your healthcare provider right away if any of these occur:    Pain becomes worse or spreads to your legs    Weakness or numbness in one or both legs    Numbness in the groin or genital area    Unexplained fever over 100.4 F (38.0 C)    Burning or pain when passing urine  Date Last Reviewed: 6/1/2016 2000-2017 [employer]. [address] [place] [address], [place] [address]. All rights reserved. This information is not intended as a substitute for professional medical care. Always follow your healthcare professional's instructions.

## 2017-08-29 NOTE — PROGRESS NOTES
SUBJECTIVE:  Evangelist Macario is a 32 year old male seen in clinic today for evaluation of back pain.   Symptoms have beening going on for 1 day.    Pain is located in the low back left region, with radiation to radiates into the right buttocks, and are at worst a 7 on a scale of 1-10.  Recent injury:recent heavy lifting  Measures which improve the pain include ice and OTC NSAIDs.  Measures which worsen the pain include standing and walking  Personal hx of back pain is recurrent self limited episodes of low back pain in the past.  There is no history of lower extremity numbness/weakness or change in bowel/bladder control.    History reviewed. No pertinent past medical history.    Social History   Substance Use Topics     Smoking status: Former Smoker     Packs/day: 0.50     Years: 1.00     Types: Cigarettes     Quit date: 5/26/2013     Smokeless tobacco: Never Used     Alcohol use Yes      Comment: once in while       ROS:  CONSTITUTIONAL:NEGATIVE for fever, chills, change in weight  INTEGUMENTARY/SKIN: NEGATIVE for worrisome rashes, moles or lesions  EYES: NEGATIVE for vision changes or irritation  ENT/MOUTH: NEGATIVE for ear, mouth and throat problems  RESP:NEGATIVE for significant cough or SOB  CV: NEGATIVE for chest pain, palpitations or peripheral edema  GI: NEGATIVE for nausea, abdominal pain, heartburn, or change in bowel habits  MUSCULOSKELETAL: See above   NEURO: NEGATIVE for weakness, dizziness or paresthesias      OBJECTIVE:  Blood pressure (!) 151/96, pulse 91, temperature 97.9  F (36.6  C), temperature source Tympanic, weight 219 lb 3.2 oz (99.4 kg), SpO2 98 %.  Back examination: Back symmetric, no curvature. ROM normal. No CVA tenderness.  GENERAL APPEARANCE: healthy, alert and no distress  RESP: lungs clear to auscultation - no rales, rhonchi or wheezes  CV: regular rates and rhythm, normal S1 S2, no murmur noted  NEURO: Normal strength and tone with no weakness or sensory deficit noted, reflexes  normal   SKIN: no suspicious lesions or rashes  Cervical   Inspection: normal cervical lordosis  Non-tender:  occipital nerves, spinous processes, scapula, medial border of scapula, superior angle of scapula, normal musculature, left paracervical muscles, right paracervical muscles, left trapezius muscles, right trapezius muscles  Range of Motion:  Full ROM of cervical spine  Strength: Full strength of all neck muscles      Tender:  lumbar spinous processes, left para lumbar muscles, left SI joint  Non-tender:  thoracic spinous processes, thoracic facet joints, right parathoracic muscles, lumbar spinous processes, lumbar facet joints, right para lumbar muscles, right pars articularis , right SI joint, left sciatic notch, right sciatic notch  Range of Motion:  left lateral thoracic bending   full, right lateral thoracic bending  full, left thoracic rotation  full, right thoracic rotation  full, lumbar flexion  full, lumbar extension  full, left lateral lumbar bending  painful, right lateral lumbar bending  full, left lateral lumbar rotation  painful, right lateral lumbar rotation  full  Strength:  able to heel walk  Special tests:  negative straight leg raises    Hip Exam: Hip ROM full        ICD-10-CM    1. Sprain of lumbar region, initial encounter S33.5XXA HYDROcodone-acetaminophen (NORCO) 5-325 MG per tablet   2. Spasm of back muscles M62.830 cyclobenzaprine (FLEXERIL) 10 MG tablet     Patient Instructions   Heat or ice may help.   Ibuprofen up to 800 mg four times daily, aleve 2 pills twice daily or acetaminophen 2 pills four times daily may help.   Stronger pain pills can be used if needed. Take muscle relaxant, Flexeril (cyclobenzaprine) up to 3 times a day as needed for pain.   OK to take the Take hydrocodone/acetaminophen 1 or 2 pills every 6 hours as needed for pain.   Recommend physical therapy if not improving.  Will need to follow-up with your primary care provider for a referral if not improving,    Recheck in 2-3 weeks if not improving or other problems.   Staying active will help. Those who continue work and daily activities get better faster. Avoid painful activities.           Back Sprain or Strain    Injury to the muscles (strain) or ligaments (sprain) around the spine can be troubling. Injury may occur after a sudden forceful twisting or bending force such as in a car accident, after a simple awkward movement, or after lifting something heavy with poor body positioning. In any case, muscle spasm is often present and adds to the pain.  Thankfully, most people feel better in 1 to 2 weeks, and most of the rest in 1 to 2 months. Most people can remain active. Unless you had a forceful or traumatic physical injury such as a car accident or fall, X-rays may not be ordered for the first evaluation of a back sprain or strain. If pain continues and does not respond to medical treatment, your healthcare provider may then order X-rays and other tests.  Home care  The following guidelines will help you care for your injury at home:    When in bed, try to find a comfortable position. A firm mattress is best. Try lying flat on your back with pillows under your knees. You can also try lying on your side with your knees bent up toward your chest and a pillow between your knees.    Don't sit for long periods. Try not to take long car rides or take other trips that have you sitting for a long time. This puts more stress on the lower back than standing or walking.    During the first 24 to 72 hours after an injury or flare-up, apply an ice pack to the painful area for 20 minutes. Then remove it for 20 minutes. Do this for 60 to 90 minutes, or several times a day. This will reduce swelling and pain. Be sure to wrap the ice pack in a thin towel or plastic to protect your skin.    You can start with ice, then switch to heat. Heat from a hot shower, hot bath, or heating pad reduces pain and works well for muscle spasms. Put  heat on the painful area for 20 minutes, then remove for 20 minutes. Do this for 60 to 90 minutes, or several times a day. Do not use a heating pad while sleeping. It can burn the skin.    You can alternate the ice and heat. Talk with your healthcare provider to find out the best treatment or therapy for your back pain.    Therapeutic massage will help relax the back muscles without stretching them.    Be aware of safe lifting methods. Do not lift anything over 15 pounds until all of the pain is gone.  Medicines  Talk to your healthcare provider before using medicines, especially if you have other health problems or are taking other medicines.    You may use acetaminophen or ibuprofen to control pain, unless another pain medicine was prescribed. If you have chronic conditions like diabetes, liver or kidney disease, stomach ulcers, or gastrointestinal bleeding, or are taking blood-thinner medicines, talk with your doctor before taking any medicines.    Be careful if you are given prescription medicines, narcotics, or medicine for muscle spasm. They can cause drowsiness, and affect your coordination, reflexes, and judgment. Do not drive or operate heavy machinery when taking these types of medicines. Only take pain medicine as prescribed by your healthcare provider.  Follow-up care  Follow up with your healthcare provider, or as advised. You may need physical therapy or more tests if your symptoms get worse.  If you had X-rays your healthcare provider may be checking for any broken bones, breaks, or fractures. Bruises and sprains can sometimes hurt as much as a fracture. These injuries can take time to heal completely. If your symptoms don t improve or they get worse, talk with your healthcare provider. You may need a repeat X-ray or other tests.  Call 911  Call for emergency care if any of the following occur:    Trouble breathing    Confused    Very drowsy or trouble awakening    Fainting or loss of  consciousness    Rapid or very slow heart rate    Loss of bowel or bladder control  When to seek medical advice  Call your healthcare provider right away if any of the following occur:    Pain gets worse or spreads to your arms or legs    Weakness or numbness in one or both arms or legs    Numbness in the groin or genital area  Date Last Reviewed: 6/1/2016 2000-2017 [employer]. [address] [place] [address]. All rights reserved. This information is not intended as a substitute for professional medical care. Always follow your healthcare professional's instructions.        Back Spasm (No Trauma)    Spasm of the back muscles can occur after a sudden forceful twisting or bending force (such as in a car accident), after a simple awkward movement, or after lifting something heavy with poor body positioning. In any case, muscle spasm adds to the pain. Sleeping in an awkward position or on a poor quality mattress can also cause this. Some people respond to emotional stress by tensing the muscles of their back.  Pain that continues may need further evaluation or other types of treatment such as physical therapy.  You don't always need X-rays for the initial evaluation of back pain, unless you had a physical injury such as from a car accident or fall. If your pain continues and doesn't respond to medical treatment, X-rays and other tests may then be done.   Home care    As soon as possible, start sitting or walking again to avoid problems from prolonged bed rest (muscle weakness, worsening back stiffness and pain, blood clots in the legs).    When in bed, try to find a position of comfort. A firm mattress is best. Try lying flat on your back with pillows under your knees. You can also try lying on your side with your knees bent up toward your chest and a pillow between your knees.    Avoid prolonged sitting, long car rides, or travel. This puts more stress on the lower back than standing or  walking.     During the first 24 to 72 hours after an injury or flare-up, apply an ice pack to the painful area for 20 minutes, then remove it for 20 minutes. Do this over a period of 60 to 90 minutes or several times a day. This will reduce swelling and pain. Always wrap ice packs in a thin towel.    You can start with ice, then switch to heat. Heat (hot shower, hot bath, or heating pad) reduces pain, and works well for muscle spasms. Apply heat to the painful area for 20 minutes, then remove it for 20 minutes. Do this over a period of 60 to 90 minutes or several times a day. Do not sleep on a heating pad as it can burn or damage skin.    Alternate ice and heat therapies.    Be aware of safe lifting methods and do not lift anything over 15 pounds until all the pain is gone.  Gentle stretching will help your back heal faster. Do this simple routine 2 to 3 times a day until your back is feeling better.    Lie on your back with your knees bent and both feet on the ground    Slowly raise your left knee to your chest as you flatten your lower back against the floor. Hold for 20 to 30 seconds.    Relax and repeat the exercise with your right knee.    Do 2 to 3 of these exercises for each leg.    Repeat, hugging both knees to your chest at the same time.    Do not bounce, but use a gentle pull.  Medicines  Talk to your doctor before using medicine, especially if you have other medical problems or are taking other medicines.  You may use acetaminophen or ibuprofen to control pain, unless your healthcare provider prescribed another pain medicine. If you have a chronic condition such as diabetes, liver or kidney disease, stomach ulcer, or gastrointestinal bleeding, or are taking blood thinners, talk with your healthcare provider before taking any medicines.  Be careful if you are given prescription pain medicine, narcotics, or medicine for muscle spasm. They can cause drowsiness, affect your coordination, reflexes, or  judgment. Do not drive or operate heavy machinery when taking these medicines. Take pain medicine only as prescribed by your healthcare provider.  Follow-up care  Follow up with your doctor, or as advised. Physical therapy or further tests may be needed.  If X-rays were taken, they may be reviewed by a radiologist. You will be notified of any new findings that may affect your care.  Call 911  Seek emergency medical care if any of these occur:    Trouble breathing    Confusion    Drowsiness or trouble awakening    Fainting or loss of consciousness    Rapid or very slow heart rate    Loss of bowel or bladder control  When to seek medical advice  Call your healthcare provider right away if any of these occur:    Pain becomes worse or spreads to your legs    Weakness or numbness in one or both legs    Numbness in the groin or genital area    Unexplained fever over 100.4 F (38.0 C)    Burning or pain when passing urine  Date Last Reviewed: 6/1/2016 2000-2017 [employer]. [address] [place], [place] [id number]. All rights reserved. This information is not intended as a substitute for professional medical care. Always follow your healthcare professional's instructions.            CHESTER Tristan CNP

## 2017-09-14 DIAGNOSIS — F33.1 MAJOR DEPRESSIVE DISORDER, RECURRENT EPISODE, MODERATE (H): ICD-10-CM

## 2017-09-14 DIAGNOSIS — F41.9 ANXIETY: ICD-10-CM

## 2017-09-14 RX ORDER — ALPRAZOLAM 0.25 MG
0.25 TABLET ORAL 3 TIMES DAILY PRN
Qty: 15 TABLET | Refills: 5 | Status: SHIPPED | OUTPATIENT
Start: 2017-09-14 | End: 2017-11-13

## 2017-09-14 NOTE — TELEPHONE ENCOUNTER
Escitalopram     Last Written Prescription Date: 01/05/2017  Last Fill Quantity: 30, # refills: 5  Last Office Visit with [facility] primary care provider:  07/17/2017        Last PHQ-9 score on record=   PHQ-9 SCORE 1/5/2017   Total Score -   Total Score 2     PHQ-9 SCORE 11/26/2013 3/5/2015 1/5/2017   Total Score 3 1 -   Total Score - - 2     ITA-7 SCORE 11/26/2013 3/5/2015 1/5/2017   Total Score 5 3 -   Total Score - - 1       Josue JAIME (R)

## 2017-09-14 NOTE — TELEPHONE ENCOUNTER
Alprazolam      Last Written Prescription Date: 7/25/17  Last Fill Quantity: 15,  # refills: 5   Last Office Visit with [facility], [facility] or [facility] prescribing provider: 7/17/17    Cierra Rebollar [facility].  [facility]

## 2017-09-19 RX ORDER — ESCITALOPRAM OXALATE 10 MG/1
10 TABLET ORAL DAILY
Qty: 90 TABLET | Refills: 0 | Status: SHIPPED | OUTPATIENT
Start: 2017-09-19 | End: 2017-10-02

## 2017-09-19 NOTE — TELEPHONE ENCOUNTER
Prescription approved per [facility] Refill Protocol. [id number]

## 2017-10-02 ENCOUNTER — OFFICE VISIT (OUTPATIENT)
Dept: FAMILY MEDICINE | Facility: CLINIC | Age: 32
End: 2017-10-02
Payer: COMMERCIAL

## 2017-10-02 VITALS
WEIGHT: 223 LBS | RESPIRATION RATE: 18 BRPM | HEART RATE: 70 BPM | DIASTOLIC BLOOD PRESSURE: 98 MMHG | HEIGHT: 73 IN | BODY MASS INDEX: 29.55 KG/M2 | SYSTOLIC BLOOD PRESSURE: 140 MMHG

## 2017-10-02 DIAGNOSIS — S39.012D STRAIN OF LUMBAR REGION, SUBSEQUENT ENCOUNTER: ICD-10-CM

## 2017-10-02 DIAGNOSIS — F33.1 MAJOR DEPRESSIVE DISORDER, RECURRENT EPISODE, MODERATE (H): ICD-10-CM

## 2017-10-02 DIAGNOSIS — Z23 NEED FOR PROPHYLACTIC VACCINATION AND INOCULATION AGAINST INFLUENZA: ICD-10-CM

## 2017-10-02 DIAGNOSIS — F41.9 ANXIETY: Primary | ICD-10-CM

## 2017-10-02 PROCEDURE — 90686 IIV4 VACC NO PRSV 0.5 ML IM: CPT | Performed by: FAMILY MEDICINE

## 2017-10-02 PROCEDURE — 99214 OFFICE O/P EST MOD 30 MIN: CPT | Mod: 25 | Performed by: FAMILY MEDICINE

## 2017-10-02 PROCEDURE — 90471 IMMUNIZATION ADMIN: CPT | Performed by: FAMILY MEDICINE

## 2017-10-02 RX ORDER — ESCITALOPRAM OXALATE 10 MG/1
10 TABLET ORAL DAILY
Qty: 90 TABLET | Refills: 1 | Status: SHIPPED | OUTPATIENT
Start: 2017-10-02 | End: 2018-07-20

## 2017-10-02 RX ORDER — BUPROPION HYDROCHLORIDE 150 MG/1
TABLET, EXTENDED RELEASE ORAL
Qty: 90 TABLET | Refills: 5 | Status: SHIPPED | OUTPATIENT
Start: 2017-10-02 | End: 2018-07-20

## 2017-10-02 ASSESSMENT — ANXIETY QUESTIONNAIRES
5. BEING SO RESTLESS THAT IT IS HARD TO SIT STILL: NEARLY EVERY DAY
3. WORRYING TOO MUCH ABOUT DIFFERENT THINGS: SEVERAL DAYS
GAD7 TOTAL SCORE: 10
1. FEELING NERVOUS, ANXIOUS, OR ON EDGE: SEVERAL DAYS
7. FEELING AFRAID AS IF SOMETHING AWFUL MIGHT HAPPEN: NOT AT ALL
6. BECOMING EASILY ANNOYED OR IRRITABLE: MORE THAN HALF THE DAYS
2. NOT BEING ABLE TO STOP OR CONTROL WORRYING: SEVERAL DAYS

## 2017-10-02 ASSESSMENT — PATIENT HEALTH QUESTIONNAIRE - PHQ9
SUM OF ALL RESPONSES TO PHQ QUESTIONS 1-9: 8
5. POOR APPETITE OR OVEREATING: MORE THAN HALF THE DAYS

## 2017-10-02 ASSESSMENT — PAIN SCALES - GENERAL: PAINLEVEL: MODERATE PAIN (5)

## 2017-10-02 NOTE — PROGRESS NOTES
Injectable Influenza Immunization Documentation    1.  Is the person to be vaccinated sick today?   No    2. Does the person to be vaccinated have an allergy to a component   of the vaccine?   No    3. Has the person to be vaccinated ever had a serious reaction   to influenza vaccine in the past?   No    4. Has the person to be vaccinated ever had Guillain-Barré syndrome?   No    Form completed by Nabila Patel MA           SUBJECTIVE:   Evangelist Macario is a 32 year old male who presents to clinic today for the following health issues:      Chief Complaint   Patient presents with     Anxiety     Patient is having increase anxiety issues and doesn't feel like the medication is helping. Patient feels like it could be triggered by his back injury. Patient was given flexeril and vicodin through [facility] and he had trouble waking up for work the next day and he was unable to take meds at work so would like to discuss. Patient rates pain 4-5/10.      Flu Shot     Medication Reconciliation     only taking 2 tablets of wellbutrin     At some point his dose of bupropion was increased from 300 mg daily to 450 mg daily, but neither he nor his wife realized that the change been made. They have a  at home which adds to his external stressors as well as dealing with this back injury. Current symptoms include:  PHQ-9 (Pfizer) 10/2/2017   No Interest In Doing Things    Feeling Depressed    Trouble Sleeping    Tired / No Energy    No appetite or Over-Eating    Feeling Bad about Self    Trouble Concentrating    Moving Slow or Restless    Suicidal Thoughts    Total Score    1.  Little interest or pleasure in doing things 1   2.  Feeling down, depressed, or hopeless 1   3.  Trouble falling or staying asleep, or sleeping too much 2   4.  Feeling tired or having little energy 2   5.  Poor appetite or overeating 1   6.  Feeling bad about yourself 1   7.  Trouble concentrating 0   8.  Moving slowly or restless 0   9.   "Suicidal or self-harm thoughts 0   PHQ-9 Total Score 8   Difficulty at work, home, or with people Somewhat difficult     ITA-7   [employer], 2002; Used with Permission) 10/2/2017   Over the last 2 weeks, how often have you been bothered by trouble relaxing?    Over the last 2 weeks, how often have you been bothered by being so restless that it is hard to sit still?    Over the last 2 weeks, how often have you been bothered by becoming easily annoyed or irritable?    Over the last 2 weeks, how often have you been bothered by feeling afraid as if something awful might happen?    ITA-7 Total Score =     1. Feeling nervous, anxious, or on edge 1   2. Not being able to stop or control worrying 1   3. Worrying too much about different things 1   4. Trouble relaxing 2   5. Being so restless that it is hard to sit still 3   6. Becoming easily annoyed or irritable 2   7. Feeling afraid, as if something awful might happen 0   ITA-7 Total Score 10   If you checked any problems, how difficult have they made it for you to do your work, take care of things at home, or get along with other people?        PROBLEMS TO ADD ON...    Problem list and histories reviewed & adjusted, as indicated.  Additional history: as documented        Reviewed and updated as needed this visit by clinical staff  Tobacco  Med Hx  Surg Hx  Fam Hx  Soc Hx      Reviewed and updated as needed this visit by Provider               ROS:  Constitutional, HEENT, cardiovascular, pulmonary, gi and gu systems are negative, except as otherwise noted.          OBJECTIVE:                                                    BP (!) 140/98  Pulse 70  Resp 18  Ht 6' 1.25\" (1.861 m)  Wt 223 lb (101.2 kg)  BMI 29.22 kg/m2    GENERAL: healthy, alert and no distress  EYES: Eyes grossly normal to inspection, extraocular movements - intact, and PERRL  MS: Back exam: Normal appearance and normal range of motion; mild tenderness of the paraspinal muscles at L2 and " extending down towards the buttocks bilaterally  NEURO: strength and tone- normal, sensory exam- grossly normal, mentation- intact, speech- normal, reflexes- symmetric  PSYCH: Alert and oriented times 3; coherent speech, normal rate and volume, able to articulate logical thoughts, able to abstract reason, no tangential thoughts, no hallucinations or delusions. Affect is normal.         ASSESSMENT/PLAN:                                                    ASSESSMENT:  1. Anxiety    2. Moderate Depression [296.32]    3. Strain of lumbar region, subsequent encounter    4. Need for prophylactic vaccination and inoculation against influenza        PLAN:  Orders Placed This Encounter     FLU VAC, SPLIT VIRUS IM > 3 YO (QUADRIVALENT) [57703]     Vaccine Administration, Initial [33546]     buPROPion (WELLBUTRIN SR) 150 MG 12 hr tablet     escitalopram (LEXAPRO) 10 MG tablet     tiZANidine (ZANAFLEX) 4 MG tablet   He should respond to a higher dose of the bupropion. Reassured him that the back pain appears to be muscular and will get better but it will just take some time. We'll try a nonsedating muscle relaxer and see if that helps  him while he is at work    Patient Instructions   Take the bupropion 3 tabs at bedtime. Escitalopram the same. Zinatadine for muscle relaxer up to 3 times a day    Thank you for choosing [facility].  You may be receiving a survey in the mail from [employer] regarding your visit today.  Please take a few minutes to complete and return the survey to let us know how we are doing.      Our Clinic hours are:  Mondays    7:20 am - 7 pm  Tues -  Fri  7:20 am - 5 pm    Clinic Phone: [phone]    The clinic lab opens at 7:30 am Mon - Fri and appointments are required.    [facility] [place]  Ph. [phone]  Monday-Thursday 8 am - 7pm  Tues/Wed/Fri 8 am - 5:30 pm             KIM Rocha  [facility]

## 2017-10-02 NOTE — MR AVS SNAPSHOT
After Visit Summary   10/2/2017    Evangelist Macario    MRN: [id number]           Patient Information     Date Of Birth          1985        Visit Information        Provider Department      10/2/2017 3:00 PM KIM Rocha MD [facility]        Today's Diagnoses     Need for prophylactic vaccination and inoculation against influenza    -  1    Anxiety        Moderate Depression [296.32]        Strain of lumbar region, subsequent encounter          Care Instructions    Take the bupropion 3 tabs at bedtime. Escitalopram the same. Zinatadine for muscle relaxer up to 3 times a day    Thank you for choosing [facility].  You may be receiving a survey in the mail from [employer] regarding your visit today.  Please take a few minutes to complete and return the survey to let us know how we are doing.      Our Clinic hours are:  Mondays    7:20 am - 7 pm  Tues -  Fri  7:20 am - 5 pm    Clinic Phone: [phone]    The clinic lab opens at 7:30 am Mon - Fri and appointments are required.    [facility]  Ph. [phone]  Monday-Thursday 8 am - 7pm  Tues/Wed/Fri 8 am - 5:30 pm                 Follow-ups after your visit        Who to contact     If you have questions or need follow up information about today's clinic visit or your schedule please contact [facility] directly at [phone].  Normal or non-critical lab and imaging results will be communicated to you by MyChart, letter or phone within 4 business days after the clinic has received the results. If you do not hear from us within 7 days, please contact the clinic through MyChart or phone. If you have a critical or abnormal lab result, we will notify you by phone as soon as possible.  Submit refill requests through [employer] or call your pharmacy and they will forward the refill request to us. Please allow 3 business days for your refill to be completed.          Additional Information  "About Your Visit        [employer] Information     [employer] lets you send messages to your doctor, view your test results, renew your prescriptions, schedule appointments and more. To sign up, go to [email or web address] . Click on \"Log in\" on the left side of the screen, which will take you to the Welcome page. Then click on \"Sign up Now\" on the right side of the page.     You will be asked to enter the access code listed below, as well as some personal information. Please follow the directions to create your username and password.     Your access code is: [email or web address]  Expires: 10/15/2017  3:38 PM     Your access code will  in 90 days. If you need help or a new code, please call your [facility] clinic or [phone].        Care EveryWhere ID     This is your Care EveryWhere ID. This could be used by other organizations to access your [facility] medical records  [id number]        Your Vitals Were     Pulse Respirations Height BMI (Body Mass Index)          70 18 6' 1.25\" (1.861 m) 29.22 kg/m2         Blood Pressure from Last 3 Encounters:   10/02/17 (!) 140/98   17 (!) 151/96   17 136/86    Weight from Last 3 Encounters:   10/02/17 223 lb (101.2 kg)   17 219 lb 3.2 oz (99.4 kg)   17 217 lb (98.4 kg)              We Performed the Following     FLU VAC, SPLIT VIRUS IM > 3 YO (QUADRIVALENT) [74581]     Vaccine Administration, Initial [00459]          Today's Medication Changes          These changes are accurate as of: 10/2/17  3:37 PM.  If you have any questions, ask your nurse or doctor.               Start taking these medicines.        Dose/Directions    tiZANidine 4 MG tablet   Commonly known as:  ZANAFLEX   Used for:  Strain of lumbar region, subsequent encounter   Started by:  KIM Rocha MD        Dose:  4 mg   Take 1 tablet (4 mg) by mouth 3 times daily   Quantity:  90 tablet   Refills:  3         These medicines have changed or have updated prescriptions.        Dose/Directions "    buPROPion 150 MG 12 hr tablet   Commonly known as:  WELLBUTRIN SR   This may have changed:    - how much to take  - how to take this  - when to take this  - additional instructions   Used for:  Anxiety, Major depressive disorder, recurrent episode, moderate (H)   Changed by:  KIM Rocha MD        3 tablets at bedtime   Quantity:  90 tablet   Refills:  5       escitalopram 10 MG tablet   Commonly known as:  LEXAPRO   This may have changed:  Another medication with the same name was removed. Continue taking this medication, and follow the directions you see here.   Used for:  Anxiety, Major depressive disorder, recurrent episode, moderate (H)   Changed by:  KIM Rocha MD        Dose:  10 mg   Take 1 tablet (10 mg) by mouth daily   Quantity:  90 tablet   Refills:  1            Where to get your medicines      These medications were sent to [facility] [place] [id number]     Phone:  [phone]     buPROPion 150 MG 12 hr tablet    escitalopram 10 MG tablet    tiZANidine 4 MG tablet                Primary Care Provider Office Phone # Fax #    KIM Rocha [phone] [address] [facility] [address]        Equal Access to Services     DESEAN MUSE AH: Hadii lam durbin hadasho Somajo, waaxda luqadaha, qaybta kaalmada adeegyada, jessica allen. So [facility] [phone].    ATENCIÓN: Si habla español, tiene a stevens disposición servicios gratuitos de asistencia lingüística. [facility] [phone].    We comply with applicable federal civil rights laws and [place] laws. We do not discriminate on the basis of race, color, national origin, age, disability, sex, sexual orientation, or gender identity.            Thank you!     Thank you for choosing [facility]  for your care. Our goal is always to provide you with excellent care. Hearing back from our patients is one way  we can continue to improve our services. Please take a few minutes to complete the written survey that you may receive in the mail after your visit with us. Thank you!             Your Updated Medication List - Protect others around you: Learn how to safely use, store and throw away your medicines at www.disposemymeds.org.          This list is accurate as of: 10/2/17  3:37 PM.  Always use your most recent med list.                   Brand Name Dispense Instructions for use Diagnosis    ALPRAZolam 0.25 MG tablet    XANAX    15 tablet    Take 1 tablet (0.25 mg) by mouth 3 times daily as needed for anxiety    Anxiety       amphetamine-dextroamphetamine 20 MG per 24 hr capsule    ADDERALL XR    2 capsule    Take 1 capsule (20 mg) by mouth daily On the day of your exam    ADD (attention deficit disorder)       buPROPion 150 MG 12 hr tablet    WELLBUTRIN SR    90 tablet    3 tablets at bedtime    Anxiety, Major depressive disorder, recurrent episode, moderate (H)       escitalopram 10 MG tablet    LEXAPRO    90 tablet    Take 1 tablet (10 mg) by mouth daily    Anxiety, Major depressive disorder, recurrent episode, moderate (H)       tiZANidine 4 MG tablet    ZANAFLEX    90 tablet    Take 1 tablet (4 mg) by mouth 3 times daily    Strain of lumbar region, subsequent encounter

## 2017-10-02 NOTE — NURSING NOTE
"Chief Complaint   Patient presents with     Anxiety     Patient is having increase anxiety issues and doesn't feel like the medication is helping. Patient feels like it could be triggered by his back injury. Patient was given flexeril and vicodin through [facility] and he had trouble waking up for work the next day and he was unable to take meds at work so would like to discuss. Patient rates pain 4-5/10.      Flu Shot     Medication Reconciliation     only taking 2 tablets of wellbutrin       Initial BP (!) 140/98  Pulse 70  Resp 18  Ht 6' 1.25\" (1.861 m)  Wt 223 lb (101.2 kg)  BMI 29.22 kg/m2 Estimated body mass index is 29.22 kg/(m^2) as calculated from the following:    Height as of this encounter: 6' 1.25\" (1.861 m).    Weight as of this encounter: 223 lb (101.2 kg).  Medication Reconciliation: complete    "

## 2017-10-02 NOTE — PATIENT INSTRUCTIONS
Take the bupropion 3 tabs at bedtime. Escitalopram the same. Zinatadine for muscle relaxer up to 3 times a day    Thank you for choosing [facility].  You may be receiving a survey in the mail from [employer] regarding your visit today.  Please take a few minutes to complete and return the survey to let us know how we are doing.      Our Clinic hours are:  Mondays    7:20 am - 7 pm  Tues -  Fri  7:20 am - 5 pm    Clinic Phone: [phone]    The clinic lab opens at 7:30 am Mon - Fri and appointments are required.    [facility]  Ph. [phone]  Monday-Thursday 8 am - 7pm  Tues/Wed/Fri 8 am - 5:30 pm

## 2017-10-03 ENCOUNTER — TELEPHONE (OUTPATIENT)
Dept: FAMILY MEDICINE | Facility: CLINIC | Age: 32
End: 2017-10-03

## 2017-10-03 DIAGNOSIS — S39.012A STRAIN OF LUMBAR REGION, INITIAL ENCOUNTER: Primary | ICD-10-CM

## 2017-10-03 ASSESSMENT — ANXIETY QUESTIONNAIRES: GAD7 TOTAL SCORE: 10

## 2017-10-03 NOTE — TELEPHONE ENCOUNTER
Pt called back.  He is not going to take the muscle relaxer.  They are making him nauseated.  Jhoana Yepez

## 2017-10-03 NOTE — TELEPHONE ENCOUNTER
Dr. Rocha,    Patient stating the muscle relaxers are not working.(1 day) and wants PT and a controlled substance prescribed for his back pain. I have pended the PT referral.   Please advise. Klaudia SOUSA RN

## 2017-10-03 NOTE — TELEPHONE ENCOUNTER
Left message for the patient to call the clinic.  Referral has been placed for him.  Call us back with questions. Klaudia SOUSA RN

## 2017-10-03 NOTE — TELEPHONE ENCOUNTER
"Reason for call:  Patient reporting a symptom    Symptom or request: Pt's spouse Nabila calling.  Pt was seen yesterday in clinic, by Dr. Rocha, for back pain and the Rx for muscle relaxers are not working.  Spouse states that pt wants an order placed for P.T. and an Rx for \"pain pills.\"  Pt can be reached @ # below.      Duration (how long have symptoms been present): ongoing    Have you been treated for this before? Yes    Additional comments:     Phone Number patient can be reached at:  Home number on file [phone] (home)    Best Time:  any    Can we leave a detailed message on this number:  YES    Call taken on 10/3/2017 at 11:39 AM by Laurel Rocha    "

## 2017-10-04 NOTE — TELEPHONE ENCOUNTER
"Pt informed of PT referral.  Pt to use ice 20\" on/off.  Ibuprofen 800 mg 3x/day.  No Narcotics for pain @ this time.  KPavelRN  "

## 2017-11-13 DIAGNOSIS — F41.9 ANXIETY: ICD-10-CM

## 2017-11-14 RX ORDER — ALPRAZOLAM 0.25 MG
0.25 TABLET ORAL 3 TIMES DAILY PRN
Qty: 15 TABLET | Refills: 5 | Status: SHIPPED | OUTPATIENT
Start: 2017-11-14 | End: 2018-01-15

## 2017-11-14 NOTE — TELEPHONE ENCOUNTER
Routing refill request to provider for review/approval because:  Drug not on the [facility] refill protocol     Seema Olivares RN

## 2017-12-28 DIAGNOSIS — F41.9 ANXIETY: ICD-10-CM

## 2017-12-28 RX ORDER — ALPRAZOLAM 0.25 MG
0.25 TABLET ORAL 3 TIMES DAILY PRN
Qty: 15 TABLET | Refills: 5 | Status: CANCELLED | OUTPATIENT
Start: 2017-12-28

## 2017-12-29 NOTE — TELEPHONE ENCOUNTER
Alprazolam    0.25 mg  Last Written Prescription Date:  11/14/2017  Last Fill Quantity: 15,   # refills: 5  Last Office Visit: 10/02/2017  Future Office visit:       Routing refill request to provider for review/approval because:  Drug not on the [facility], [facility] or [facility] refill protocol or controlled substance    Josue JAIME (R)

## 2017-12-29 NOTE — TELEPHONE ENCOUNTER
LM to call clinic/RN.  Has pt taken all 6 refills?  Written as 3 tabs/day PRN?  Need to clarify how he is taking/symptoms.  LillianRN

## 2018-01-03 NOTE — TELEPHONE ENCOUNTER
Left message for patient to return call to clinic.  See below - need clarifaction.  Carmencita LANCE RN

## 2018-01-04 NOTE — TELEPHONE ENCOUNTER
Attempt #3  Left message for patient to return call to clinic.  Will await patient return call - closing encounter.  Carmencita LANCE RN

## 2018-01-15 DIAGNOSIS — F41.9 ANXIETY: ICD-10-CM

## 2018-01-15 NOTE — TELEPHONE ENCOUNTER
ALPRAZolam (XANAX) 0.25 MG tablet      Last Written Prescription Date:  11/14/2017  Last Fill Quantity: 15,   # refills: 5  Last Office Visit: 10/02/2017  Future Office visit:       Routing refill request to provider for review/approval because:  Drug not on the [facility], [facility] or [facility] refill protocol or controlled substance      Josue JAIME (R)

## 2018-01-16 RX ORDER — ALPRAZOLAM 0.25 MG
0.25 TABLET ORAL 3 TIMES DAILY PRN
Qty: 15 TABLET | Refills: 5 | Status: SHIPPED | OUTPATIENT
Start: 2018-01-16 | End: 2018-03-06

## 2018-03-06 DIAGNOSIS — F41.9 ANXIETY: ICD-10-CM

## 2018-03-06 RX ORDER — ALPRAZOLAM 0.25 MG
0.25 TABLET ORAL 3 TIMES DAILY PRN
Qty: 15 TABLET | Refills: 5 | Status: SHIPPED | OUTPATIENT
Start: 2018-03-06 | End: 2018-05-02

## 2018-03-06 NOTE — TELEPHONE ENCOUNTER
Routing refill request for alprazolam to provider for review/approval because:  Drug not on the [facility] refill protocol       Laura CUELLAR RN

## 2018-03-06 NOTE — TELEPHONE ENCOUNTER
Thank you  Lynnette Corona CPht    [facility]  Phone: [phone]  Fax: [phone]

## 2018-03-06 NOTE — TELEPHONE ENCOUNTER
Routing refill request to provider for review/approval because:  Drug not on the [facility] refill protocol   Last fill on 1/16/2018 with 5 refills.    Last OV on 10/2/17    Thank you  Carola BULLOCK RN

## 2018-05-02 DIAGNOSIS — F41.9 ANXIETY: ICD-10-CM

## 2018-05-02 NOTE — TELEPHONE ENCOUNTER
Controlled Substance Refill Request for ALPRAZolam (XANAX) 0.25 MG tablet  Problem List Complete:  Yes   checked in past 6 months?  No, route to RN         Last Written Prescription Date:  3-6-18  Last Fill Quantity: 15,   # refills: 5  Last Office Visit: 10-2-17  Future Office visit:       Routing refill request to provider for review/approval because:  Drug not on the [facility], [facility] or [facility] refill protocol or controlled substance

## 2018-05-03 RX ORDER — ALPRAZOLAM 0.25 MG
0.25 TABLET ORAL 3 TIMES DAILY PRN
Qty: 15 TABLET | Refills: 1 | Status: SHIPPED | OUTPATIENT
Start: 2018-05-03 | End: 2018-05-17

## 2018-05-17 DIAGNOSIS — F41.9 ANXIETY: ICD-10-CM

## 2018-05-17 RX ORDER — ALPRAZOLAM 0.25 MG
0.25 TABLET ORAL 3 TIMES DAILY PRN
Qty: 15 TABLET | Refills: 1 | Status: SHIPPED | OUTPATIENT
Start: 2018-05-17 | End: 2018-05-29

## 2018-05-17 NOTE — TELEPHONE ENCOUNTER
ALPRAZolam (XANAX) 0.25 MG tablet        Last Written Prescription Date:  5/3/2018  Last Fill Quantity: 15,   # refills: 1  Last Office Visit: 10/2/2017 RP  Future Office visit:       Routing refill request to provider for review/approval because:  na

## 2018-05-17 NOTE — TELEPHONE ENCOUNTER
Routing refill request to provider for review/approval because:  Drug not on the [facility] refill protocol   Laura CUELLAR RN

## 2018-05-29 DIAGNOSIS — F41.9 ANXIETY: ICD-10-CM

## 2018-05-29 NOTE — TELEPHONE ENCOUNTER
Controlled Substance Refill Request for ALPRAZolam (XANAX) 0.25 MG tablet  Problem List Complete:  Yes   checked in past 6 months?  No, route to RN         Last Written Prescription Date:  5-17-18  Last Fill Quantity: 15,   # refills: 1  Last Office Visit: 10-2-17  Future Office visit:       Routing refill request to provider for review/approval because:  Drug not on the [facility], [facility] or [facility] refill protocol or controlled substance

## 2018-05-29 NOTE — TELEPHONE ENCOUNTER
Thank you  Lynnette Corona CPht    [facility]  Phone: [phone]  Fax: [phone]

## 2018-05-31 RX ORDER — ALPRAZOLAM 0.25 MG
0.25 TABLET ORAL 3 TIMES DAILY PRN
Qty: 15 TABLET | Refills: 1 | Status: SHIPPED | OUTPATIENT
Start: 2018-05-31 | End: 2018-06-12

## 2018-06-12 DIAGNOSIS — F41.9 ANXIETY: ICD-10-CM

## 2018-06-12 NOTE — TELEPHONE ENCOUNTER
ALPRAZolam (XANAX) 0.25 MG tablet      Last Written Prescription Date:  5/31/2018  Last Fill Quantity: 15,   # refills: 1  Last Office Visit: 10/02/2017  Future Office visit:       Routing refill request to provider for review/approval because:  Drug not on the [facility], [facility] or [facility] refill protocol or controlled substance

## 2018-06-14 RX ORDER — ALPRAZOLAM 0.25 MG
0.25 TABLET ORAL 3 TIMES DAILY PRN
Qty: 15 TABLET | Refills: 1 | Status: SHIPPED | OUTPATIENT
Start: 2018-06-14 | End: 2018-06-27

## 2018-06-27 DIAGNOSIS — F41.9 ANXIETY: ICD-10-CM

## 2018-06-27 NOTE — TELEPHONE ENCOUNTER
Requested Prescriptions   Pending Prescriptions Disp Refills     ALPRAZolam (XANAX) 0.25 MG tablet  Last Written Prescription Date:  06/14/2018  Last Fill Quantity: 15,  # refills: 1   Last office visit: 10/2/2017 with prescribing provider:  post   Future Office Visit:     15 tablet 1     Sig: Take 1 tablet (0.25 mg) by mouth 3 times daily as needed for anxiety    There is no refill protocol information for this order        Josue LopezR)

## 2018-06-28 RX ORDER — ALPRAZOLAM 0.25 MG
0.25 TABLET ORAL 3 TIMES DAILY PRN
Qty: 15 TABLET | Refills: 1 | Status: SHIPPED | OUTPATIENT
Start: 2018-06-28 | End: 2018-07-20

## 2018-06-28 NOTE — TELEPHONE ENCOUNTER
Rx signed and faxed to [facility]. Patient notified.  Sharmila Edwards  Clinic Station  Flex

## 2018-06-28 NOTE — TELEPHONE ENCOUNTER
Xanax refill.  Written 6/14/18 #15 + 1 refill, filled on 6/14 and 6/19.  Last seen 10/2/17.  Advise.  Celeste

## 2018-07-10 ENCOUNTER — TELEPHONE (OUTPATIENT)
Dept: FAMILY MEDICINE | Facility: CLINIC | Age: 33
End: 2018-07-10

## 2018-07-10 DIAGNOSIS — F41.9 ANXIETY: ICD-10-CM

## 2018-07-11 NOTE — TELEPHONE ENCOUNTER
Requested Prescriptions   Pending Prescriptions Disp Refills     ALPRAZolam (XANAX) 0.25 MG tablet  Last Written Prescription Date:  06/28/2018  Last Fill Quantity: 15,  # refills: 1   Last office visit: 10/2/2017 with prescribing provider:  post   Future Office Visit:     15 tablet 1     Sig: Take 1 tablet (0.25 mg) by mouth 3 times daily as needed for anxiety    There is no refill protocol information for this order        Josue LopezR)

## 2018-07-12 RX ORDER — ALPRAZOLAM 0.25 MG
0.25 TABLET ORAL 3 TIMES DAILY PRN
Qty: 15 TABLET | Refills: 1 | OUTPATIENT
Start: 2018-07-12

## 2018-07-12 NOTE — TELEPHONE ENCOUNTER
Left message for patient to return call to clinic.  Patient needs appt.  LOV 10/17 to discuss anxiety.  He has been getting refills of this w/o OV.  Last refill #15 + 1 refill RX dated 6-28-18, he used both these and requesting another refill 7-12-18 that is #30 in 14 days.    This is a PRN medication.  Needs OV.  Carmencita LANCE RN

## 2018-07-20 ENCOUNTER — OFFICE VISIT (OUTPATIENT)
Dept: FAMILY MEDICINE | Facility: CLINIC | Age: 33
End: 2018-07-20
Payer: COMMERCIAL

## 2018-07-20 VITALS
HEART RATE: 80 BPM | RESPIRATION RATE: 16 BRPM | SYSTOLIC BLOOD PRESSURE: 120 MMHG | TEMPERATURE: 97.8 F | BODY MASS INDEX: 28.47 KG/M2 | HEIGHT: 73 IN | WEIGHT: 214.8 LBS | DIASTOLIC BLOOD PRESSURE: 82 MMHG

## 2018-07-20 DIAGNOSIS — F41.9 ANXIETY: ICD-10-CM

## 2018-07-20 DIAGNOSIS — F33.1 MAJOR DEPRESSIVE DISORDER, RECURRENT EPISODE, MODERATE (H): ICD-10-CM

## 2018-07-20 DIAGNOSIS — F90.0 ATTENTION DEFICIT HYPERACTIVITY DISORDER (ADHD), PREDOMINANTLY INATTENTIVE TYPE: ICD-10-CM

## 2018-07-20 PROCEDURE — 99213 OFFICE O/P EST LOW 20 MIN: CPT | Performed by: FAMILY MEDICINE

## 2018-07-20 RX ORDER — ESCITALOPRAM OXALATE 10 MG/1
10 TABLET ORAL DAILY
Qty: 90 TABLET | Refills: 1 | Status: SHIPPED | OUTPATIENT
Start: 2018-07-20 | End: 2019-12-04

## 2018-07-20 RX ORDER — DEXTROAMPHETAMINE SACCHARATE, AMPHETAMINE ASPARTATE MONOHYDRATE, DEXTROAMPHETAMINE SULFATE AND AMPHETAMINE SULFATE 5; 5; 5; 5 MG/1; MG/1; MG/1; MG/1
20 CAPSULE, EXTENDED RELEASE ORAL DAILY
Qty: 2 CAPSULE | Refills: 0 | Status: SHIPPED | OUTPATIENT
Start: 2018-07-20 | End: 2021-01-05

## 2018-07-20 RX ORDER — ALPRAZOLAM 0.25 MG
0.25 TABLET ORAL 3 TIMES DAILY PRN
Qty: 30 TABLET | Refills: 5 | Status: SHIPPED | OUTPATIENT
Start: 2018-07-20 | End: 2018-10-03

## 2018-07-20 RX ORDER — BUPROPION HYDROCHLORIDE 150 MG/1
TABLET, EXTENDED RELEASE ORAL
Qty: 90 TABLET | Refills: 5 | Status: SHIPPED | OUTPATIENT
Start: 2018-07-20 | End: 2019-03-18

## 2018-07-20 ASSESSMENT — ANXIETY QUESTIONNAIRES
6. BECOMING EASILY ANNOYED OR IRRITABLE: SEVERAL DAYS
3. WORRYING TOO MUCH ABOUT DIFFERENT THINGS: SEVERAL DAYS
7. FEELING AFRAID AS IF SOMETHING AWFUL MIGHT HAPPEN: SEVERAL DAYS
5. BEING SO RESTLESS THAT IT IS HARD TO SIT STILL: SEVERAL DAYS
2. NOT BEING ABLE TO STOP OR CONTROL WORRYING: NOT AT ALL
IF YOU CHECKED OFF ANY PROBLEMS ON THIS QUESTIONNAIRE, HOW DIFFICULT HAVE THESE PROBLEMS MADE IT FOR YOU TO DO YOUR WORK, TAKE CARE OF THINGS AT HOME, OR GET ALONG WITH OTHER PEOPLE: SOMEWHAT DIFFICULT
GAD7 TOTAL SCORE: 5
1. FEELING NERVOUS, ANXIOUS, OR ON EDGE: SEVERAL DAYS

## 2018-07-20 ASSESSMENT — PATIENT HEALTH QUESTIONNAIRE - PHQ9: 5. POOR APPETITE OR OVEREATING: NOT AT ALL

## 2018-07-20 NOTE — MR AVS SNAPSHOT
"              After Visit Summary   7/20/2018    Evangelist Macario    MRN: [id number]           Patient Information     Date Of Birth          1985        Visit Information        Provider Department      7/20/2018 9:00 AM KIM Rocha MD [facility]        Today's Diagnoses     Anxiety        Moderate Depression [296.32]        Attention deficit hyperactivity disorder (ADHD), predominantly inattentive type           Follow-ups after your visit        Who to contact     If you have questions or need follow up information about today's clinic visit or your schedule please contact [facility] directly at [phone].  Normal or non-critical lab and imaging results will be communicated to you by MyChart, letter or phone within 4 business days after the clinic has received the results. If you do not hear from us within 7 days, please contact the clinic through MyChart or phone. If you have a critical or abnormal lab result, we will notify you by phone as soon as possible.  Submit refill requests through [employer] or call your pharmacy and they will forward the refill request to us. Please allow 3 business days for your refill to be completed.          Additional Information About Your Visit        Care EveryWhere ID     This is your Care EveryWhere ID. This could be used by other organizations to access your [place] medical records  [id number]        Your Vitals Were     Pulse Temperature Respirations Height BMI (Body Mass Index)       80 97.8  F (36.6  C) (Tympanic) 16 6' 1.25\" (1.861 m) 28.15 kg/m2        Blood Pressure from Last 3 Encounters:   07/20/18 120/82   10/02/17 (!) 140/98   08/29/17 (!) 151/96    Weight from Last 3 Encounters:   07/20/18 214 lb 12.8 oz (97.4 kg)   10/02/17 223 lb (101.2 kg)   08/29/17 219 lb 3.2 oz (99.4 kg)              Today, you had the following     No orders found for display         Where to get your medicines      These medications were sent " to [facility] [id number] [place] [address]  [address] [place] [id number]     Phone:  [phone]     buPROPion 150 MG 12 hr tablet    escitalopram 10 MG tablet         Some of these will need a paper prescription and others can be bought over the counter.  Ask your nurse if you have questions.     Bring a paper prescription for each of these medications     ALPRAZolam 0.25 MG tablet    amphetamine-dextroamphetamine 20 MG per 24 hr capsule          Primary Care Provider Office Phone # Fax #    R Edvin Rocha [phone] [address] [facility] [address]        Equal Access to Services     DESEAN MUSE : Hadii aad gifty hadasho Socinthyaali, waaxda luqadaha, qaybta kaalmada adeegyada, jessica allen. So [facility] [phone].    ATENCIÓN: Si habla español, tiene a stevens disposición servicios gratuitos de asistencia lingüística. [facility] [phone].    We comply with applicable federal civil rights laws and [place] laws. We do not discriminate on the basis of race, color, national origin, age, disability, sex, sexual orientation, or gender identity.            Thank you!     Thank you for choosing [facility]  for your care. Our goal is always to provide you with excellent care. Hearing back from our patients is one way we can continue to improve our services. Please take a few minutes to complete the written survey that you may receive in the mail after your visit with us. Thank you!             Your Updated Medication List - Protect others around you: Learn how to safely use, store and throw away your medicines at www.disposemymeds.org.          This list is accurate as of 7/20/18 10:03 AM.  Always use your most recent med list.                   Brand Name Dispense Instructions for use Diagnosis    ALPRAZolam 0.25 MG tablet    XANAX    30 tablet    Take 1 tablet (0.25 mg) by mouth 3 times daily as needed for  anxiety    Anxiety       amphetamine-dextroamphetamine 20 MG per 24 hr capsule    ADDERALL XR    2 capsule    Take 1 capsule (20 mg) by mouth daily On the day of your exam    Attention deficit hyperactivity disorder (ADHD), predominantly inattentive type       buPROPion 150 MG 12 hr tablet    WELLBUTRIN SR    90 tablet    3 tablets at bedtime    Anxiety, Major depressive disorder, recurrent episode, moderate (H)       escitalopram 10 MG tablet    LEXAPRO    90 tablet    Take 1 tablet (10 mg) by mouth daily    Anxiety, Major depressive disorder, recurrent episode, moderate (H)

## 2018-07-20 NOTE — PROGRESS NOTES
Subjective:  Evangelist Macario is a 32 year old male   Chief Complaint   Patient presents with     Depression     Anxiety   Last year we increased his dose of bupropion to 450 mg daily and that has really helped with his depression.  In the fall he was also dealing with a nagging back injury.  This is finally settled down so that he is not dealing with pain also.  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  Current symptoms include:    PHQ-9 ([employer]) 7/20/2018   No Interest In Doing Things    Feeling Depressed    Trouble Sleeping    Tired / No Energy    No appetite or Over-Eating    Feeling Bad about Self    Trouble Concentrating    Moving Slow or Restless    Suicidal Thoughts    Total Score    1.  Little interest or pleasure in doing things 0   2.  Feeling down, depressed, or hopeless 0   3.  Trouble falling or staying asleep, or sleeping too much 1   4.  Feeling tired or having little energy 1   5.  Poor appetite or overeating 0   6.  Feeling bad about yourself 0   7.  Trouble concentrating 1   8.  Moving slowly or restless 0   9.  Suicidal or self-harm thoughts 0   PHQ-9 Total Score 3   Difficulty at work, home, or with people Not difficult at all     PHQ-9 ([employer]) 7/20/2018   No Interest In Doing Things    Feeling Depressed    Trouble Sleeping    Tired / No Energy    No appetite or Over-Eating    Feeling Bad about Self    Trouble Concentrating    Moving Slow or Restless    Suicidal Thoughts    Total Score    1.  Little interest or pleasure in doing things 0   2.  Feeling down, depressed, or hopeless 0   3.  Trouble falling or staying asleep, or sleeping too much 1   4.  Feeling tired or having little energy 1   5.  Poor appetite or overeating 0   6.  Feeling bad about yourself 0   7.  Trouble concentrating 1   8.  Moving slowly or restless 0   9.  Suicidal or self-harm thoughts 0   PHQ-9 Total Score 3   Difficulty at work,  home, or with people Not difficult at all           Encounter Diagnoses   Name Primary?     Anxiety      Moderate Depression [296.32]      Attention deficit hyperactivity disorder (ADHD), predominantly inattentive type        ROS:other than noted above, general, HEENT, respiratory, cardiac, gastrointestinal systems are negative    Medical, surgical, social, and family histories, medications and allergies reviewed and updated.    Objective:  Exam:    GENERAL APPEARANCE ADULT: Alert, no acute distress  EYES: PERRL, EOM normal, conjunctiva and lids normal  PSYCH: mentation appears normal., affect and mood normal      ASSESSMENT:  1. Anxiety    2. Moderate Depression [296.32]    3. Attention deficit hyperactivity disorder (ADHD), predominantly inattentive type        PLAN:  Orders Placed This Encounter     ALPRAZolam (XANAX) 0.25 MG tablet     amphetamine-dextroamphetamine (ADDERALL XR) 20 MG per 24 hr capsule     buPROPion (WELLBUTRIN SR) 150 MG 12 hr tablet     escitalopram (LEXAPRO) 10 MG tablet     Recheck in 6 months, sooner if any issues

## 2018-07-21 ASSESSMENT — ANXIETY QUESTIONNAIRES: GAD7 TOTAL SCORE: 5

## 2018-07-21 ASSESSMENT — PATIENT HEALTH QUESTIONNAIRE - PHQ9: SUM OF ALL RESPONSES TO PHQ QUESTIONS 1-9: 3

## 2018-10-03 DIAGNOSIS — F41.9 ANXIETY: ICD-10-CM

## 2018-10-03 NOTE — TELEPHONE ENCOUNTER
Requested Prescriptions   Pending Prescriptions Disp Refills     ALPRAZolam (XANAX) 0.25 MG tablet  Last Written Prescription Date:  07/20/2018  Last Fill Quantity: 30,  # refills: 5   Last office visit: 7/20/2018 with prescribing provider:  post   Future Office Visit:     30 tablet 5     Sig: Take 1 tablet (0.25 mg) by mouth 3 times daily as needed for anxiety    There is no refill protocol information for this order        Josue LopezR)

## 2018-10-03 NOTE — TELEPHONE ENCOUNTER
Routing refill request to provider for review/approval because:  Drug not on the [facility] refill protocol     Thank you    Carola BULLOCK RN

## 2018-10-04 RX ORDER — ALPRAZOLAM 0.25 MG
0.25 TABLET ORAL 3 TIMES DAILY PRN
Qty: 30 TABLET | Refills: 5 | Status: SHIPPED | OUTPATIENT
Start: 2018-10-04 | End: 2018-11-27

## 2018-11-27 DIAGNOSIS — F41.9 ANXIETY: ICD-10-CM

## 2018-11-27 RX ORDER — ALPRAZOLAM 0.25 MG
0.25 TABLET ORAL 3 TIMES DAILY PRN
Qty: 30 TABLET | Refills: 5 | Status: SHIPPED | OUTPATIENT
Start: 2018-11-27 | End: 2019-03-18

## 2018-11-27 NOTE — TELEPHONE ENCOUNTER
Requested Prescriptions   Pending Prescriptions Disp Refills     ALPRAZolam (XANAX) 0.25 MG tablet 30 tablet 5     Sig: Take 1 tablet (0.25 mg) by mouth 3 times daily as needed for anxiety    Last Written Prescription Date:  10/4/2018  Last Fill Quantity: 30,   # refills: 5  Last Office Visit: 7/20/2018 with   Future Office visit:       Routing refill request to provider for review/approval because:  Drug not on the [facility] or [facility] refill protocol or controlled substance

## 2019-01-22 DIAGNOSIS — F41.9 ANXIETY: ICD-10-CM

## 2019-01-22 RX ORDER — ALPRAZOLAM 0.25 MG
0.25 TABLET ORAL 3 TIMES DAILY PRN
Qty: 30 TABLET | Refills: 5 | OUTPATIENT
Start: 2019-01-22

## 2019-01-22 NOTE — TELEPHONE ENCOUNTER
Requested Prescriptions   Pending Prescriptions Disp Refills     ALPRAZolam (XANAX) 0.25 MG tablet 30 tablet 5     Sig: Take 1 tablet (0.25 mg) by mouth 3 times daily as needed for anxiety    Last Written Prescription Date:  11/27/2018  Last Fill Quantity: 30,   # refills: 5  Last Office Visit: 7/20/2018 with    Future Office visit:       Routing refill request to provider for review/approval because:  Drug not on the [facility] or [facility] refill protocol or controlled substance

## 2019-03-18 ENCOUNTER — OFFICE VISIT (OUTPATIENT)
Dept: FAMILY MEDICINE | Facility: CLINIC | Age: 34
End: 2019-03-18
Payer: COMMERCIAL

## 2019-03-18 VITALS
TEMPERATURE: 99 F | HEART RATE: 72 BPM | WEIGHT: 229 LBS | BODY MASS INDEX: 30.35 KG/M2 | SYSTOLIC BLOOD PRESSURE: 138 MMHG | RESPIRATION RATE: 16 BRPM | DIASTOLIC BLOOD PRESSURE: 80 MMHG | HEIGHT: 73 IN

## 2019-03-18 DIAGNOSIS — F41.9 ANXIETY: ICD-10-CM

## 2019-03-18 DIAGNOSIS — F33.1 MAJOR DEPRESSIVE DISORDER, RECURRENT EPISODE, MODERATE (H): ICD-10-CM

## 2019-03-18 PROCEDURE — 99213 OFFICE O/P EST LOW 20 MIN: CPT | Performed by: NURSE PRACTITIONER

## 2019-03-18 RX ORDER — ALPRAZOLAM 0.25 MG
0.25 TABLET ORAL 3 TIMES DAILY PRN
Qty: 30 TABLET | Refills: 5 | Status: SHIPPED | OUTPATIENT
Start: 2019-03-18 | End: 2019-05-09

## 2019-03-18 RX ORDER — BUPROPION HYDROCHLORIDE 150 MG/1
TABLET, EXTENDED RELEASE ORAL
Qty: 90 TABLET | Refills: 5 | Status: SHIPPED | OUTPATIENT
Start: 2019-03-18 | End: 2019-12-04

## 2019-03-18 RX ORDER — ESCITALOPRAM OXALATE 20 MG/1
20 TABLET ORAL DAILY
Qty: 90 TABLET | Refills: 3 | Status: SHIPPED | OUTPATIENT
Start: 2019-03-18 | End: 2019-12-04

## 2019-03-18 ASSESSMENT — ANXIETY QUESTIONNAIRES
2. NOT BEING ABLE TO STOP OR CONTROL WORRYING: SEVERAL DAYS
GAD7 TOTAL SCORE: 5
3. WORRYING TOO MUCH ABOUT DIFFERENT THINGS: SEVERAL DAYS
4. TROUBLE RELAXING: SEVERAL DAYS
GAD7 TOTAL SCORE: 5
7. FEELING AFRAID AS IF SOMETHING AWFUL MIGHT HAPPEN: NOT AT ALL
7. FEELING AFRAID AS IF SOMETHING AWFUL MIGHT HAPPEN: NOT AT ALL
GAD7 TOTAL SCORE: 5
6. BECOMING EASILY ANNOYED OR IRRITABLE: NOT AT ALL
1. FEELING NERVOUS, ANXIOUS, OR ON EDGE: SEVERAL DAYS
5. BEING SO RESTLESS THAT IT IS HARD TO SIT STILL: SEVERAL DAYS

## 2019-03-18 ASSESSMENT — PATIENT HEALTH QUESTIONNAIRE - PHQ9
SUM OF ALL RESPONSES TO PHQ QUESTIONS 1-9: 3
SUM OF ALL RESPONSES TO PHQ QUESTIONS 1-9: 3

## 2019-03-18 ASSESSMENT — MIFFLIN-ST. JEOR: SCORE: 2046.58

## 2019-03-18 NOTE — PROGRESS NOTES
SUBJECTIVE:   Evangelist Macario is a 33 year old male who presents to clinic today for the following health issues:    Depression and Anxiety Follow-Up    Status since last visit: No change    Other associated symptoms:None    Complicating factors:     Significant life event: No     Current substance abuse: None    PHQ 10/2/2017 2018 3/18/2019   PHQ-9 Total Score 8 3 3   Q9: Suicide Ideation Not at all Not at all Not at all     ITA-7 SCORE 10/2/2017 2018 3/18/2019   Total Score - - -   Total Score - - 5 (mild anxiety)   Total Score 10 5 5     In the past two weeks have you had thoughts of suicide or self-harm?  No.    Do you have concerns about your personal safety or the safety of others?   No  PHQ-9  English  PHQ-9   Any Language  ITA-7  Suicide Assessment Five-step Evaluation and Treatment (SAFE-T)    Amount of exercise or physical activity: None    Problems taking medications regularly: No    Medication side effects: none    Diet: regular (no restrictions)      Problem list and histories reviewed & adjusted, as indicated.  Additional history: as documented    Patient Active Problem List   Diagnosis     Tobacco use disorder     CARDIOVASCULAR SCREENING; LDL GOAL LESS THAN 160     Moderate Depression [296.32]     Anxiety     Attention deficit hyperactivity disorder (ADHD), predominantly inattentive type     Past Surgical History:   Procedure Laterality Date     SURGICAL HISTORY OF -   3/25/2004    Right Inguinal Herniorrhaphy with mesh     SURGICAL HISTORY OF -   1998    Open Appendectomy     SURGICAL HISTORY OF -   1998    Open Left inguinal Heerniorrhaphy.       Social History     Tobacco Use     Smoking status: Former Smoker     Packs/day: 0.50     Years: 1.00     Pack years: 0.50     Types: Cigarettes     Last attempt to quit: 2013     Years since quittin.8     Smokeless tobacco: Never Used   Substance Use Topics     Alcohol use: Yes     Comment: once in while     Family History  "  Problem Relation Age of Onset     Diabetes Brother      Cancer Father         pancreatic         Current Outpatient Medications   Medication Sig Dispense Refill     ALPRAZolam (XANAX) 0.25 MG tablet Take 1 tablet (0.25 mg) by mouth 3 times daily as needed for anxiety 30 tablet 5     buPROPion (WELLBUTRIN SR) 150 MG 12 hr tablet 3 tablets at bedtime 90 tablet 5     escitalopram (LEXAPRO) 10 MG tablet Take 1 tablet (10 mg) by mouth daily 90 tablet 1     amphetamine-dextroamphetamine (ADDERALL XR) 20 MG per 24 hr capsule Take 1 capsule (20 mg) by mouth daily On the day of your exam (Patient not taking: Reported on 3/18/2019) 2 capsule 0     No Known Allergies  No lab results found.   BP Readings from Last 3 Encounters:   03/18/19 138/80   07/20/18 120/82   10/02/17 (!) 140/98    Wt Readings from Last 3 Encounters:   03/18/19 103.9 kg (229 lb)   07/20/18 97.4 kg (214 lb 12.8 oz)   10/02/17 101.2 kg (223 lb)                    Reviewed and updated as needed this visit by clinical staff       Reviewed and updated as needed this visit by Provider         ROS:  Constitutional, HEENT, cardiovascular, pulmonary, gi and gu systems are negative, except as otherwise noted.    OBJECTIVE:     /80 (BP Location: Right arm, Cuff Size: Adult Large)   Pulse 72   Temp 99  F (37.2  C) (Tympanic)   Resp 16   Ht 1.861 m (6' 1.25\")   Wt 103.9 kg (229 lb)   BMI 30.01 kg/m    Body mass index is 30.01 kg/m .  GENERAL: healthy, alert and no distress  EYES: Eyes grossly normal to inspection, PERRL and conjunctivae and sclerae normal  HENT: ear canals and TM's normal, nose and mouth without ulcers or lesions  NECK: no adenopathy, no asymmetry, masses, or scars and thyroid normal to palpation  RESP: lungs clear to auscultation - no rales, rhonchi or wheezes  CV: regular rate and rhythm, normal S1 S2, no S3 or S4, no murmur, click or rub, no peripheral edema and peripheral pulses strong  MS: no gross musculoskeletal defects noted, no " edema  SKIN: no suspicious lesions or rashes  NEURO: Normal strength and tone, mentation intact and speech normal  PSYCH: mentation appears normal, affect normal/bright      ASSESSMENT/PLAN:   (F41.9) Anxiety  Comment: Patient has increased anxiety through the winter.  States his anxiety is mildly elevated.  Will increase the Lexapro to 20 mg.  Will renew Xanax.  Patient uses just on Sundays increased anxiety for returning to work.  Plan: ALPRAZolam (XANAX) 0.25 MG tablet, buPROPion         (WELLBUTRIN SR) 150 MG 12 hr tablet,         escitalopram (LEXAPRO) 20 MG tablet       (F33.1) Moderate Depression [296.32]  Comment:   Plan: buPROPion (WELLBUTRIN SR) 150 MG 12 hr tablet,         escitalopram (LEXAPRO) 20 MG tablet       [facility]

## 2019-03-18 NOTE — LETTER
My Depression Action Plan  Name: Evangelist Macario   Date of Birth 1985  Date: 3/18/2019    My doctor: KIM Rocha   My clinic: [address] [id number] [phone]          GREEN    ZONE   Good Control    What it looks like:     Things are going generally well. You have normal up s and down s. You may even feel depressed from time to time, but bad moods usually last less than a day.   What you need to do:  1. Continue to care for yourself (see self care plan)  2. Check your depression survival kit and update it as needed  3. Follow your physician s recommendations including any medication.  4. Do not stop taking medication unless you consult with your physician first.           YELLOW         ZONE Getting Worse    What it looks like:     Depression is starting to interfere with your life.     It may be hard to get out of bed; you may be starting to isolate yourself from others.    Symptoms of depression are starting to last most all day and this has happened for several days.     You may have suicidal thoughts but they are not constant.   What you need to do:     1. Call your care team, your response to treatment will improve if you keep your care team informed of your progress. Yellow periods are signs an adjustment may need to be made.     2. Continue your self-care, even if you have to fake it!    3. Talk to someone in your support network    4. Open up your depression survival kit           RED    ZONE Medical Alert - Get Help    What it looks like:     Depression is seriously interfering with your life.     You may experience these or other symptoms: You can t get out of bed most days, can t work or engage in other necessary activities, you have trouble taking care of basic hygiene, or basic responsibilities, thoughts of suicide or death that will not go away, self-injurious behavior.     What you need to do:  1. Call your care team and  request a same-day appointment. If they are not available (weekends or after hours) call your local crisis line, emergency room or 911.            Depression Self Care Plan / Survival Kit    Self-Care for Depression  Here s the deal. Your body and mind are really not as separate as most people think.  What you do and think affects how you feel and how you feel influences what you do and think. This means if you do things that people who feel good do, it will help you feel better.  Sometimes this is all it takes.  There is also a place for medication and therapy depending on how severe your depression is, so be sure to consult with your medical provider and/ or Behavioral Health Consultant if your symptoms are worsening or not improving.     In order to better manage my stress, I will:    Exercise  Get some form of exercise, every day. This will help reduce pain and release endorphins, the  feel good  chemicals in your brain. This is almost as good as taking antidepressants!  This is not the same as joining a gym and then never going! (they count on that by the way ) It can be as simple as just going for a walk or doing some gardening, anything that will get you moving.      Hygiene   Maintain good hygiene (Get out of bed in the morning, Make your bed, Brush your teeth, Take a shower, and Get dressed like you were going to work, even if you are unemployed).  If your clothes don't fit try to get ones that do.    Diet  I will strive to eat foods that are good for me, drink plenty of water, and avoid excessive sugar, caffeine, alcohol, and other mood-altering substances.  Some foods that are helpful in depression are: complex carbohydrates, B vitamins, flaxseed, fish or fish oil, fresh fruits and vegetables.    Psychotherapy  I agree to participate in Individual Therapy (if recommended).    Medication  If prescribed medications, I agree to take them.  Missing doses can result in serious side effects.  I understand that  drinking alcohol, or other illicit drug use, may cause potential side effects.  I will not stop my medication abruptly without first discussing it with my provider.    Staying Connected With Others  I will stay in touch with my friends, family members, and my primary care provider/team.    Use your imagination  Be creative.  We all have a creative side; it doesn t matter if it s oil painting, sand castles, or mud pies! This will also kick up the endorphins.    Witness Beauty  (AKA stop and smell the roses) Take a look outside, even in mid-winter. Notice colors, textures. Watch the squirrels and birds.     Service to others  Be of service to others.  There is always someone else in need.  By helping others we can  get out of ourselves  and remember the really important things.  This also provides opportunities for practicing all the other parts of the program.    Humor  Laugh and be silly!  Adjust your TV habits for less news and crime-drama and more comedy.    Control your stress  Try breathing deep, massage therapy, biofeedback, and meditation. Find time to relax each day.     My support system    Clinic Contact:  Phone number:    Contact 1:  Phone number:    Contact 2:  Phone number:    [employer]/:  Phone number:    Therapist:  Phone number:    Local crisis center:    Phone number:    Other community support:  Phone number:

## 2019-03-18 NOTE — PATIENT INSTRUCTIONS
Patient Education     Anxiety Reaction  Anxiety is the feeling we all get when we think something bad might happen. It is a normal response to stress and usually causes only a mild reaction. When anxiety becomes more severe, it can interfere with daily life. In some cases, you may not even be aware of what it is you re anxious about. There may also be a genetic link or it may be a learned behavior in the home.  Both psychological and physical triggers cause stress reaction. It's often a response to fear or emotional stress, real or imagined. This stress may come from home, family, work, or social relationships.  During an anxiety reaction, you may feel:    Helpless    Nervous    Depressed    Irritable  Your body may show signs of anxiety in many ways. You may experience:    Dry mouth    Shakiness    Dizziness    Weakness    Trouble breathing    Breathing fast (hyperventilating)    Chest pressure    Sweating    Headache    Nausea    Diarrhea    Tiredness    Inability to sleep    Sexual problems  Home care    Try to locate the sources of stress in your life. They may not be obvious. These may include:  ? Daily hassles of life (such as traffic jams, missed appointments, or car troubles)  ? Major life changes, both good (new baby or job promotion) and bad (loss of job or loss of loved one)  ? Overload: feeling that you have too many responsibilities and can't take care of all of them at once  ? Feeling helpless or feeling that your problems are beyond what you re able to solve    Notice how your body reacts to stress. Learn to listen to your body signals. This will help you take action before the stress becomes severe.    When you can, do something about the source of your stress. (Avoid hassles, limit the amount of change that happens in your life at one time and take a break when you feel overloaded).    Unfortunately, many stressful situations can't be avoided. It is necessary to learn how to better manage stress.  There are many proven methods that will reduce your anxiety. These include simple things like exercise, good nutrition, and adequate rest. Also, there are certain techniques that are helpful:  ? Relaxation  ? Breathing exercises  ? Visualization  ? Biofeedback  ? Meditation  For more information about this, consult your healthcare provider or go to a local bookstore and review the many books and tapes available on this subject.  Follow-up care  If you feel that your anxiety is not responding to self-help measures, contact your healthcare provider or make an appointment with a counselor. You may need short-term psychological counseling and temporary medicine to help you manage stress.  Call 911  Call 911 if any of these happen:    Trouble breathing    Confusion    Drowsiness or trouble wakening    Fainting or loss of consciousness    Rapid heart rate    Seizure    New chest pain that becomes more severe, lasts longer, or spreads into your shoulder, arm, neck, jaw, or back  When to seek medical advice  Call your healthcare provider right away if any of these happen:    Your symptoms get worse    Severe headache not relieved by rest and mild pain reliever  Date Last Reviewed: 10/1/2017    4131-1891 [employer]. [address] [place] [address]. All rights reserved. This information is not intended as a substitute for professional medical care. Always follow your healthcare professional's instructions.           Patient Education     Anxiety Reaction  Anxiety is the feeling we all get when we think something bad might happen. It is a normal response to stress and usually causes only a mild reaction. When anxiety becomes more severe, it can interfere with daily life. In some cases, you may not even be aware of what it is you re anxious about. There may also be a genetic link or it may be a learned behavior in the home.  Both psychological and physical triggers cause stress reaction. It's often a  response to fear or emotional stress, real or imagined. This stress may come from home, family, work, or social relationships.  During an anxiety reaction, you may feel:    Helpless    Nervous    Depressed    Irritable  Your body may show signs of anxiety in many ways. You may experience:    Dry mouth    Shakiness    Dizziness    Weakness    Trouble breathing    Breathing fast (hyperventilating)    Chest pressure    Sweating    Headache    Nausea    Diarrhea    Tiredness    Inability to sleep    Sexual problems  Home care    Try to locate the sources of stress in your life. They may not be obvious. These may include:  ? Daily hassles of life (such as traffic jams, missed appointments, or car troubles)  ? Major life changes, both good (new baby or job promotion) and bad (loss of job or loss of loved one)  ? Overload: feeling that you have too many responsibilities and can't take care of all of them at once  ? Feeling helpless or feeling that your problems are beyond what you re able to solve    Notice how your body reacts to stress. Learn to listen to your body signals. This will help you take action before the stress becomes severe.    When you can, do something about the source of your stress. (Avoid hassles, limit the amount of change that happens in your life at one time and take a break when you feel overloaded).    Unfortunately, many stressful situations can't be avoided. It is necessary to learn how to better manage stress. There are many proven methods that will reduce your anxiety. These include simple things like exercise, good nutrition, and adequate rest. Also, there are certain techniques that are helpful:  ? Relaxation  ? Breathing exercises  ? Visualization  ? Biofeedback  ? Meditation  For more information about this, consult your healthcare provider or go to a local bookstore and review the many books and tapes available on this subject.  Follow-up care  If you feel that your anxiety is not  responding to self-help measures, contact your healthcare provider or make an appointment with a counselor. You may need short-term psychological counseling and temporary medicine to help you manage stress.  Call 911  Call 911 if any of these happen:    Trouble breathing    Confusion    Drowsiness or trouble wakening    Fainting or loss of consciousness    Rapid heart rate    Seizure    New chest pain that becomes more severe, lasts longer, or spreads into your shoulder, arm, neck, jaw, or back  When to seek medical advice  Call your healthcare provider right away if any of these happen:    Your symptoms get worse    Severe headache not relieved by rest and mild pain reliever  Date Last Reviewed: 10/1/2017    8506-6465 [employer]. [address] [place] [address], [place] [address]. All rights reserved. This information is not intended as a substitute for professional medical care. Always follow your healthcare professional's instructions.

## 2019-03-19 ASSESSMENT — ANXIETY QUESTIONNAIRES: GAD7 TOTAL SCORE: 5

## 2019-05-07 DIAGNOSIS — F41.9 ANXIETY: ICD-10-CM

## 2019-05-07 NOTE — TELEPHONE ENCOUNTER
Requested Prescriptions   Pending Prescriptions Disp Refills     ALPRAZolam (XANAX) 0.25 MG tablet 30 tablet 5     Sig: Take 1 tablet (0.25 mg) by mouth 3 times daily as needed for anxiety       There is no refill protocol information for this order        Last Written Prescription Date:  03/18/19  Last Fill Quantity: 30,  # refills: 5   Last office visit: 3/18/2019 with prescribing provider:  03/18/19   Future Office Visit:

## 2019-05-09 RX ORDER — ALPRAZOLAM 0.25 MG
0.25 TABLET ORAL 3 TIMES DAILY PRN
Qty: 30 TABLET | Refills: 5 | Status: SHIPPED | OUTPATIENT
Start: 2019-05-09 | End: 2019-07-01

## 2019-07-01 DIAGNOSIS — F41.9 ANXIETY: ICD-10-CM

## 2019-07-01 RX ORDER — ALPRAZOLAM 0.25 MG
0.25 TABLET ORAL 3 TIMES DAILY PRN
Qty: 30 TABLET | Refills: 5 | Status: SHIPPED | OUTPATIENT
Start: 2019-07-01 | End: 2019-12-11

## 2019-07-01 NOTE — TELEPHONE ENCOUNTER
Thank you  Lynnette Corona CPht    [facility]  Phone: [phone]  Fax: [phone]

## 2019-07-01 NOTE — TELEPHONE ENCOUNTER
Requested Prescriptions   Pending Prescriptions Disp Refills     ALPRAZolam (XANAX) 0.25 MG tablet 30 tablet 5     Sig: Take 1 tablet (0.25 mg) by mouth 3 times daily as needed for anxiety       There is no refill protocol information for this order             Last Written Prescription Date:  5/9/19  Last Fill Quantity: 30,   # refills: 5  Last Office Visit: 3/18/19  Tawana  Future Office visit:       Routing refill request to provider for review/approval because:  Drug not on the [facility], [facility] or [facility] refill protocol or controlled substance

## 2019-07-15 ENCOUNTER — TELEPHONE (OUTPATIENT)
Dept: FAMILY MEDICINE | Facility: CLINIC | Age: 34
End: 2019-07-15

## 2019-07-15 DIAGNOSIS — R06.83 SNORING: Primary | ICD-10-CM

## 2019-07-15 NOTE — TELEPHONE ENCOUNTER
Reason for Call:  Per Spouse Nabila .Referral for Sleep Study. Symptoms Snoring is worse, Spouse Nabila hears him stop breathing at time during his sleep, Exhausted  Does pt need appt with Provider first?     Phone Number Patient can be reached at: Other phone number: Pt number is [phone]- Nabila [phone]    Best Time: Any Time      Can we leave a detailed message on this number? YES    Call taken on 7/15/2019 at 12:50 PM by Anastasia Blank

## 2019-08-24 DIAGNOSIS — F41.9 ANXIETY: ICD-10-CM

## 2019-08-26 RX ORDER — ALPRAZOLAM 0.25 MG
TABLET ORAL
Qty: 30 TABLET | Refills: 5 | OUTPATIENT
Start: 2019-08-26

## 2019-08-26 NOTE — TELEPHONE ENCOUNTER
Requested Prescriptions   Pending Prescriptions Disp Refills     ALPRAZolam (XANAX) 0.25 MG tablet [Pharmacy Med Name: ALPRAZOLAM 0.25MG TABS] 30 tablet 5     Sig: TAKE ONE TABLET BY MOUTH THREE TIMES A DAY AS NEEDED FOR ANXIETY       There is no refill protocol information for this order        /Last Written Prescription Date:  7/1/19  Last Fill Quantity: 30,  # refills: 5   Last office visit: 3/18/2019 with prescribing provider:  Lauren Gilliam Office Visit:

## 2019-08-26 NOTE — TELEPHONE ENCOUNTER
Last filled 8/19/2019 refill request to early     Will need appointment for further refills last appointment was 5/2019

## 2019-08-26 NOTE — TELEPHONE ENCOUNTER
Left message to call back, CSS, please notify with Lauren's message below, assist patient with scheduling, thank you.    ALEXANDRA Quintanilla

## 2019-08-28 DIAGNOSIS — F41.9 ANXIETY: ICD-10-CM

## 2019-08-28 RX ORDER — ALPRAZOLAM 0.25 MG
TABLET ORAL
Qty: 30 TABLET | Refills: 5 | OUTPATIENT
Start: 2019-08-28

## 2019-08-28 NOTE — TELEPHONE ENCOUNTER
Requested Prescriptions   Pending Prescriptions Disp Refills     ALPRAZolam (XANAX) 0.25 MG tablet [Pharmacy Med Name: ALPRAZOLAM 0.25MG TABS] 30 tablet 5     Sig: TAKE ONE TABLET BY MOUTH THREE TIMES A DAY AS NEEDED FOR ANXIETY       There is no refill protocol information for this order        Last Written Prescription Date:  7/1/19  Last Fill Quantity: 30,  # refills: 5   Last office visit: 3/18/2019 with prescribing provider:  Lauren Gilliam   Future Office Visit:

## 2019-12-04 ENCOUNTER — OFFICE VISIT (OUTPATIENT)
Dept: FAMILY MEDICINE | Facility: CLINIC | Age: 34
End: 2019-12-04
Payer: COMMERCIAL

## 2019-12-04 VITALS
HEIGHT: 73 IN | SYSTOLIC BLOOD PRESSURE: 150 MMHG | BODY MASS INDEX: 30.48 KG/M2 | WEIGHT: 230 LBS | DIASTOLIC BLOOD PRESSURE: 102 MMHG | RESPIRATION RATE: 16 BRPM | TEMPERATURE: 98.4 F | HEART RATE: 92 BPM

## 2019-12-04 DIAGNOSIS — I10 BENIGN ESSENTIAL HYPERTENSION: Primary | ICD-10-CM

## 2019-12-04 DIAGNOSIS — R06.83 SNORING: ICD-10-CM

## 2019-12-04 DIAGNOSIS — F33.1 MAJOR DEPRESSIVE DISORDER, RECURRENT EPISODE, MODERATE (H): ICD-10-CM

## 2019-12-04 DIAGNOSIS — Z23 NEED FOR PROPHYLACTIC VACCINATION AND INOCULATION AGAINST INFLUENZA: ICD-10-CM

## 2019-12-04 DIAGNOSIS — F41.9 ANXIETY: ICD-10-CM

## 2019-12-04 LAB
ANION GAP SERPL CALCULATED.3IONS-SCNC: 3 MMOL/L (ref 3–14)
BUN SERPL-MCNC: 10 MG/DL (ref 7–30)
CALCIUM SERPL-MCNC: 9.2 MG/DL (ref 8.5–10.1)
CHLORIDE SERPL-SCNC: 103 MMOL/L (ref 94–109)
CO2 SERPL-SCNC: 28 MMOL/L (ref 20–32)
CREAT SERPL-MCNC: 0.91 MG/DL (ref 0.66–1.25)
GFR SERPL CREATININE-BSD FRML MDRD: >90 ML/MIN/{1.73_M2}
GLUCOSE SERPL-MCNC: 105 MG/DL (ref 70–99)
POTASSIUM SERPL-SCNC: 4 MMOL/L (ref 3.4–5.3)
SODIUM SERPL-SCNC: 134 MMOL/L (ref 133–144)

## 2019-12-04 PROCEDURE — 36415 COLL VENOUS BLD VENIPUNCTURE: CPT | Performed by: NURSE PRACTITIONER

## 2019-12-04 PROCEDURE — 90686 IIV4 VACC NO PRSV 0.5 ML IM: CPT | Performed by: NURSE PRACTITIONER

## 2019-12-04 PROCEDURE — 90471 IMMUNIZATION ADMIN: CPT | Performed by: NURSE PRACTITIONER

## 2019-12-04 PROCEDURE — 99214 OFFICE O/P EST MOD 30 MIN: CPT | Mod: 25 | Performed by: NURSE PRACTITIONER

## 2019-12-04 PROCEDURE — 80048 BASIC METABOLIC PNL TOTAL CA: CPT | Performed by: NURSE PRACTITIONER

## 2019-12-04 RX ORDER — LISINOPRIL 20 MG/1
20 TABLET ORAL DAILY
Qty: 90 TABLET | Refills: 0 | Status: SHIPPED | OUTPATIENT
Start: 2019-12-04 | End: 2022-11-03

## 2019-12-04 RX ORDER — BUPROPION HYDROCHLORIDE 150 MG/1
TABLET, EXTENDED RELEASE ORAL
Qty: 120 TABLET | Refills: 3 | Status: SHIPPED | OUTPATIENT
Start: 2019-12-04 | End: 2020-06-18

## 2019-12-04 RX ORDER — ESCITALOPRAM OXALATE 20 MG/1
20 TABLET ORAL DAILY
Qty: 90 TABLET | Refills: 3 | Status: SHIPPED | OUTPATIENT
Start: 2019-12-04 | End: 2021-01-05

## 2019-12-04 ASSESSMENT — ANXIETY QUESTIONNAIRES
6. BECOMING EASILY ANNOYED OR IRRITABLE: NOT AT ALL
GAD7 TOTAL SCORE: 4
3. WORRYING TOO MUCH ABOUT DIFFERENT THINGS: NOT AT ALL
2. NOT BEING ABLE TO STOP OR CONTROL WORRYING: SEVERAL DAYS
7. FEELING AFRAID AS IF SOMETHING AWFUL MIGHT HAPPEN: NOT AT ALL
5. BEING SO RESTLESS THAT IT IS HARD TO SIT STILL: SEVERAL DAYS
1. FEELING NERVOUS, ANXIOUS, OR ON EDGE: SEVERAL DAYS

## 2019-12-04 ASSESSMENT — MIFFLIN-ST. JEOR: SCORE: 2046.11

## 2019-12-04 ASSESSMENT — PATIENT HEALTH QUESTIONNAIRE - PHQ9
SUM OF ALL RESPONSES TO PHQ QUESTIONS 1-9: 3
5. POOR APPETITE OR OVEREATING: SEVERAL DAYS

## 2019-12-04 NOTE — LETTER
December 9, 2019      Evangelist CLAY Srinathcarrie  [address] [facility] [address]        Dear ,    We are writing to inform you of your test results.    The results of your basic metabolic panel were normal.     Resulted Orders   Basic metabolic panel   Result Value Ref Range    Sodium 134 133 - 144 mmol/L    Potassium 4.0 3.4 - 5.3 mmol/L    Chloride 103 94 - 109 mmol/L    Carbon Dioxide 28 20 - 32 mmol/L    Anion Gap 3 3 - 14 mmol/L    Glucose 105 (H) 70 - 99 mg/dL      Comment:      Non Fasting    Urea Nitrogen 10 7 - 30 mg/dL    Creatinine 0.91 0.66 - 1.25 mg/dL    GFR Estimate >90 >60 mL/min/[1.73_m2]      Comment:      Non  GFR Calc  Starting 12/18/2018, serum creatinine based estimated GFR (eGFR) will be   calculated using the Chronic Kidney Disease Epidemiology Collaboration   (CKD-EPI) equation.      GFR Estimate If Black >90 >60 mL/min/[1.73_m2]      Comment:       GFR Calc  Starting 12/18/2018, serum creatinine based estimated GFR (eGFR) will be   calculated using the Chronic Kidney Disease Epidemiology Collaboration   (CKD-EPI) equation.      Calcium 9.2 8.5 - 10.1 mg/dL       If you have any questions or concerns, please call the clinic at the number listed above.       Sincerely,        Lauren Gilliam, CHESTER CNP/kaf

## 2019-12-04 NOTE — PROGRESS NOTES
Subjective     Evangelist Macario is a 34 year old male who presents to clinic today for the following health issues:    HPI   Depression and Anxiety Follow-Up    How are you doing with your depression since your last visit? Stable     How are you doing with your anxiety since your last visit?  Stable     Are you having other symptoms that might be associated with depression or anxiety? Yes:  . Feels anxiety is situational    Have you had a significant life event? No     Do you have any concerns with your use of alcohol or other drugs? No    Social History     Tobacco Use     Smoking status: Former Smoker     Packs/day: 0.50     Years: 1.00     Pack years: 0.50     Types: Cigarettes     Last attempt to quit: 2013     Years since quittin.5     Smokeless tobacco: Never Used   Substance Use Topics     Alcohol use: Yes     Comment: once in while     Drug use: No     PHQ 10/2/2017 2018 3/18/2019   PHQ-9 Total Score 8 3 3   Q9: Thoughts of better off dead/self-harm past 2 weeks Not at all Not at all Not at all     ITA-7 SCORE 10/2/2017 2018 3/18/2019   Total Score - - -   Total Score - - 5 (mild anxiety)   Total Score 10 5 5     ITA-7  3/18/2019   1. Feeling nervous, anxious, or on edge 1   2. Not being able to stop or control worrying 1   3. Worrying too much about different things 1   4. Trouble relaxing 1   5. Being so restless that it is hard to sit still 1   6. Becoming easily annoyed or irritable 0   7. Feeling afraid, as if something awful might happen 0   ITA-7 Total Score 5   If you checked any problems, how difficult have they made it for you to do your work, take care of things at home, or get along with other people? -     In the past two weeks have you had thoughts of suicide or self-harm?  No.    Do you have concerns about your personal safety or the safety of others?   No    Suicide Assessment Five-step Evaluation and Treatment (SAFE-T)    Sleep Problem       Duration: 1 year      Description (location/character/radiation): States wife tells him he snores very loud and will gasp for air at night     Intensity:  moderate    Accompanying signs and symptoms: fatigue during the day     History (similar episodes/previous evaluation): None    Precipitating or alleviating factors: None    Therapies tried and outcome: None          Patient Active Problem List   Diagnosis     Tobacco use disorder     CARDIOVASCULAR SCREENING; LDL GOAL LESS THAN 160     Moderate Depression [296.32]     Anxiety     Attention deficit hyperactivity disorder (ADHD), predominantly inattentive type     Past Surgical History:   Procedure Laterality Date     SURGICAL HISTORY OF -   3/25/2004    Right Inguinal Herniorrhaphy with mesh     SURGICAL HISTORY OF -   1998    Open Appendectomy     SURGICAL HISTORY OF -   1998    Open Left inguinal Heerniorrhaphy.       Social History     Tobacco Use     Smoking status: Former Smoker     Packs/day: 0.50     Years: 1.00     Pack years: 0.50     Types: Cigarettes     Last attempt to quit: 2013     Years since quittin.5     Smokeless tobacco: Never Used   Substance Use Topics     Alcohol use: Yes     Comment: once in while     Family History   Problem Relation Age of Onset     Diabetes Brother      Cancer Father         pancreatic         Current Outpatient Medications   Medication Sig Dispense Refill     ALPRAZolam (XANAX) 0.25 MG tablet Take 1 tablet (0.25 mg) by mouth 3 times daily as needed for anxiety 30 tablet 5     buPROPion (WELLBUTRIN SR) 150 MG 12 hr tablet 3 tablets at bedtime 90 tablet 5     escitalopram (LEXAPRO) 20 MG tablet Take 1 tablet (20 mg) by mouth daily 90 tablet 3     amphetamine-dextroamphetamine (ADDERALL XR) 20 MG per 24 hr capsule Take 1 capsule (20 mg) by mouth daily On the day of your exam (Patient not taking: Reported on 3/18/2019) 2 capsule 0     No Known Allergies  No lab results found.   BP Readings from Last 3 Encounters:   19  "(!) 150/102   03/18/19 138/80   07/20/18 120/82    Wt Readings from Last 3 Encounters:   12/04/19 104.3 kg (230 lb)   03/18/19 103.9 kg (229 lb)   07/20/18 97.4 kg (214 lb 12.8 oz)                    Reviewed and updated as needed this visit by Provider         Review of Systems   ROS COMP: Constitutional, HEENT, cardiovascular, pulmonary, gi and gu systems are negative, except as otherwise noted.      Objective    BP (!) 150/102 (Cuff Size: Adult Large)   Pulse 92   Temp 98.4  F (36.9  C) (Tympanic)   Resp 16   Ht 1.861 m (6' 1.25\")   Wt 104.3 kg (230 lb)   BMI 30.14 kg/m    There is no height or weight on file to calculate BMI.  Physical Exam   GENERAL: healthy, alert and no distress  EYES: Eyes grossly normal to inspection, PERRL and conjunctivae and sclerae normal  HENT: ear canals and TM's normal, nose and mouth without ulcers or lesions  NECK: no adenopathy, no asymmetry, masses, or scars and thyroid normal to palpation  RESP: lungs clear to auscultation - no rales, rhonchi or wheezes  CV: regular rate and rhythm, normal S1 S2, no S3 or S4, no murmur, click or rub, no peripheral edema and peripheral pulses strong  ABDOMEN: soft, nontender, no hepatosplenomegaly, no masses and bowel sounds normal  MS: no gross musculoskeletal defects noted, no edema  SKIN: no suspicious lesions or rashes  NEURO: Normal strength and tone, mentation intact and speech normal  PSYCH: mentation appears normal, affect normal/bright    Diagnostic Test Results:  Results for orders placed or performed in visit on 12/04/19   Basic metabolic panel     Status: Abnormal   Result Value Ref Range    Sodium 134 133 - 144 mmol/L    Potassium 4.0 3.4 - 5.3 mmol/L    Chloride 103 94 - 109 mmol/L    Carbon Dioxide 28 20 - 32 mmol/L    Anion Gap 3 3 - 14 mmol/L    Glucose 105 (H) 70 - 99 mg/dL    Urea Nitrogen 10 7 - 30 mg/dL    Creatinine 0.91 0.66 - 1.25 mg/dL    GFR Estimate >90 >60 mL/min/[1.73_m2]    GFR Estimate If Black >90 >60 " "mL/min/[1.73_m2]    Calcium 9.2 8.5 - 10.1 mg/dL             Assessment & Plan     (I10) Benign essential hypertension  (primary encounter diagnosis)  Comment: Controlled medications renewed   Plan: lisinopril (PRINIVIL/ZESTRIL) 20 MG tablet,         Basic metabolic panel           (F41.9) Anxiety  Comment: controlled medications renewed   Plan: buPROPion (WELLBUTRIN SR) 150 MG 12 hr tablet,         escitalopram (LEXAPRO) 20 MG tablet            (F33.1) Moderate Depression [296.32]  Comment:   Plan: buPROPion (WELLBUTRIN SR) 150 MG 12 hr tablet,         escitalopram (LEXAPRO) 20 MG tablet            (R06.83) Snoring  Comment: Concern for sleep apnea referral made for testing   Plan: SLEEP EVALUATION & MANAGEMENT REFERRAL - [facility]  [phone]        (Age 2 and up)           (Z23) Need for prophylactic vaccination and inoculation against influenza  Comment:  Plan: INFLUENZA VACCINE IM > 6 MONTHS VALENT IIV4         [96140], Vaccine Administration, Initial         [28150]               BMI:   Estimated body mass index is 30.14 kg/m  as calculated from the following:    Height as of this encounter: 1.861 m (6' 1.25\").    Weight as of this encounter: 104.3 kg (230 lb).   Weight management plan: Discussed healthy diet and exercise guidelines        See Patient Instructions    No follow-ups on file.    [facility]      "

## 2019-12-04 NOTE — PATIENT INSTRUCTIONS
Patient Education     Anxiety Reaction  Anxiety is the feeling we all get when we think something bad might happen. It is a normal response to stress and usually causes only a mild reaction. When anxiety becomes more severe, it can interfere with daily life. In some cases, you may not even be aware of what it is you re anxious about. There may also be a genetic link or it may be a learned behavior in the home.  Both psychological and physical triggers cause stress reaction. It's often a response to fear or emotional stress, real or imagined. This stress may come from home, family, work, or social relationships.  During an anxiety reaction, you may feel:    Helpless    Nervous    Depressed    Irritable  Your body may show signs of anxiety in many ways. You may experience:    Dry mouth    Shakiness    Dizziness    Weakness    Trouble breathing    Breathing fast (hyperventilating)    Chest pressure    Sweating    Headache    Nausea    Diarrhea    Tiredness    Inability to sleep    Sexual problems  Home care    Try to locate the sources of stress in your life. They may not be obvious. These may include:  ? Daily hassles of life (such as traffic jams, missed appointments, or car troubles)  ? Major life changes, both good (new baby or job promotion) and bad (loss of job or loss of loved one)  ? Overload: feeling that you have too many responsibilities and can't take care of all of them at once  ? Feeling helpless or feeling that your problems are beyond what you re able to solve    Notice how your body reacts to stress. Learn to listen to your body signals. This will help you take action before the stress becomes severe.    When you can, do something about the source of your stress. (Avoid hassles, limit the amount of change that happens in your life at one time and take a break when you feel overloaded).    Unfortunately, many stressful situations can't be avoided. It is necessary to learn how to better manage stress.  There are many proven methods that will reduce your anxiety. These include simple things like exercise, good nutrition, and adequate rest. Also, there are certain techniques that are helpful:  ? Relaxation  ? Breathing exercises  ? Visualization  ? Biofeedback  ? Meditation  For more information about this, consult your healthcare provider or go to a local bookstore and review the many books and tapes available on this subject.  Follow-up care  If you feel that your anxiety is not responding to self-help measures, contact your healthcare provider or make an appointment with a counselor. You may need short-term psychological counseling and temporary medicine to help you manage stress.  Call 911  Call 911 if any of these happen:    Trouble breathing    Confusion    Drowsiness or trouble wakening    Fainting or loss of consciousness    Rapid heart rate    Seizure    New chest pain that becomes more severe, lasts longer, or spreads into your shoulder, arm, neck, jaw, or back  When to seek medical advice  Call your healthcare provider right away if any of these happen:    Your symptoms get worse    Severe headache not relieved by rest and mild pain reliever  Date Last Reviewed: 10/1/2017    7575-0175 [employer]. [address] [place] [address]. All rights reserved. This information is not intended as a substitute for professional medical care. Always follow your healthcare professional's instructions.           Patient Education     Depression  Depression is one of the most common mental health problems today. It is not just a state of unhappiness or sadness. It is a true disease. The cause seems to be related to a decrease in chemicals that transmit signals in the brain. Having a family history of depression, alcoholism, or suicide increases the risk. Chronic illness, chronic pain, migraine headaches, and high emotional stress also increase the risk.  Depression is something we tend to recognize  in others, but may have a hard time seeing in ourselves. It can show in many physical and emotional ways:    Loss of appetite    Overeating    Not being able to sleep    Sleeping too much    Tiredness not related to physical exertion    Restlessness or irritability    Slowness of movement or speech    Feeling depressed or withdrawn    Loss of interest in things you once enjoyed    Trouble concentrating, poor memory, trouble making decisions    Thoughts of harming or killing oneself, or thoughts that life is not worth living    Low self-esteem  The treatment for depression may include both medicine and psychotherapy. Antidepressants can reduce suffering and can improve the ability to function during the depressed period. Therapy can offer emotional support and help you understand emotional factors that may be causing the depression.  Home care    Ongoing care and support help people manage this disease. Find a healthcare provider and therapist who meet your needs. Seek help when you feel like you may be getting ill.    Be kind to yourself. Make it a point to do things that you enjoy (gardening, walking in nature, going to a movie). Reward yourself for small successes.    Take care of your physical body. Eat a balanced diet (low in saturated fat and high in fruits and vegetables). Exercise at least 3 times a week for 30 minutes. Even mild-moderate exercise (like brisk walking) can make you feel better.    Don't drink alcohol, which can make depression worse.    Take medicine as prescribed.    Tell each of your healthcare providers about all of the prescription and over-the-counter medicines, vitamins, and supplements you take. Certain supplements interact with medicines and can result in dangerous side effects. Ask your pharmacist when you have questions about medicine interactions.    Talk with your family and trusted friends about your feelings and thoughts. Ask them to help you recognize behavior changes early so  you can get help and, if needed, medicine can be adjusted.  Follow-up care  Follow up with your healthcare provider, or as advised.  Call 911  Call 911 if you:    Have suicidal thoughts, a suicide plan, and the means to carry out the plan; or serious thoughts of hurting someone else     Have trouble breathing    Are very confused    Feel very drowsy or have trouble awakening    Faint or lose consciousness    Have new chest pain that becomes more severe, lasts longer, or spreads into your shoulder, arm, neck, jaw, or back  When to seek medical advice  Call your healthcare provider right away if any of these happen:    Feeling extreme depression, fear, anxiety, or anger toward yourself or others    Feeling out of control    Feeling that you may try to harm yourself or another    Hearing voices that others do not hear    Seeing things that others do not see    Can t sleep or eat for 3 days in a row    Friends or family express concern over your behavior and ask you to seek help  Date Last Reviewed: 10/1/2017    8753-2866 [employer]. [address] [place] [address]. All rights reserved. This information is not intended as a substitute for professional medical care. Always follow your healthcare professional's instructions.           Patient Education     What Are Snoring and Obstructive Sleep Apnea?  If you ve ever had a stuffed-up nose, you know the feeling of trying to breathe through a very narrow passageway. This is what happens in your throat when you snore. While you sleep, structures in your throat partly block your air passage. This makes the passage narrow and hard to breathe through. In some cases the entire passage may become blocked so you can t breathe at all. Then you have obstructive sleep apnea.      Snoring Obstructive sleep apnea   Snoring  If your throat structures are too large or the muscles relax too much during sleep, the air passage may be partly blocked for a while  (temporarily). But over time the air gets past. As air from the nose or mouth passes around this blockage, the throat structures vibrate. This causes the familiar sound of snoring. This sound can be loud. Snorers may wake up others, or even themselves, during the night. Snoring gets worse as more and more of the air passage is blocked.  Obstructive sleep apnea  If the structures partly or completely block the throat, air can t flow to the lungs at all. This is called hypopnea (decreased breathing) or apnea (meaning  no breathing ). The lungs aren t getting fresh air. So the brain tells the body to wake up just enough to tighten the muscles and unblock the air passage. With a loud gasp, breathing begins again. This process may be repeated over and over again during the night. This can make your sleep fragmented with lighter stages of sleep. You won t remember waking up many times during the night. But due to lighter sleep you will feel tired the next day. The lack of sleep and fresh air can also strain your lungs, heart, and other organs. This leads to problems such as high blood pressure, heart attack, or stroke.  Problems in the nose and jaw  Problems in the structure of the nose may block breathing. A crooked (deviated) septum or swollen turbinates can make snoring worse or lead to apnea. Also, a receding jaw may make the tongue sit too far back. Then it s more likely to block the airway when you re asleep.        Date Last Reviewed: 8/1/2017 2000-2018 [employer]. [address] [place] [address], [place] [address]. All rights reserved. This information is not intended as a substitute for professional medical care. Always follow your healthcare professional's instructions.

## 2019-12-05 ASSESSMENT — ANXIETY QUESTIONNAIRES: GAD7 TOTAL SCORE: 4

## 2019-12-06 ENCOUNTER — NURSE TRIAGE (OUTPATIENT)
Dept: NURSING | Facility: CLINIC | Age: 34
End: 2019-12-06

## 2019-12-07 NOTE — TELEPHONE ENCOUNTER
[facility] had called for test results and this nurse called him back, at: [phone], and received a generic answering machine. Message was left to call back through clinic # to speak to a nurse.    Reason for Disposition    [1] Follow-up call to recent contact AND [2] information only call, no triage required    Additional Information    Negative: [1] Caller is not with the adult (patient) AND [2] reporting urgent symptoms    Negative: Lab result questions    Negative: Medication questions    Negative: Caller can't be reached by phone    Negative: Caller has already spoken to PCP or another triager    Negative: RN needs further essential information from caller in order to complete triage    Negative: Requesting regular office appointment    Negative: [1] Caller requesting NON-URGENT health information AND [2] PCP's office is the best resource    Negative: Health Information question, no triage required and triager able to answer question    Negative: General information question, no triage required and triager able to answer question    Negative: Question about upcoming scheduled test, no triage required and triager able to answer question    Negative: [1] Caller is not with the adult (patient) AND [2] probable NON-URGENT symptoms    Protocols used: INFORMATION ONLY CALL-A-

## 2020-03-21 DIAGNOSIS — F41.9 ANXIETY: ICD-10-CM

## 2020-03-21 NOTE — TELEPHONE ENCOUNTER
ALPRAZOLAM 0.25MG TABS   Last Written Prescription Date:  2/7/20  Last Fill Quantity: 30,  # refills: 0   Last office visit: 12/4/2019 with prescribing provider:  DORY   Future Office Visit:    Requested Prescriptions   Pending Prescriptions Disp Refills     ALPRAZolam (XANAX) 0.25 MG tablet [Pharmacy Med Name: ALPRAZOLAM 0.25MG TABS] 30 tablet 0     Sig: TAKE ONE TABLET BY MOUTH THREE TIMES A DAY AS NEEDED FOR ANXIETY       There is no refill protocol information for this order

## 2020-03-23 NOTE — TELEPHONE ENCOUNTER
[place] ([place]) reviewed:  reviewed- no concerns    [place]:  [email or web address]      02/07/2020  1   02/07/2020  Alprazolam 0.25 Mg Tablet  30.00 10 Ka Kirstie  [id number]  [place] ([id number])  0/[place] 1.50 [place]   12/23/2019  1   12/11/2019  Alprazolam 0.25 Mg Tablet  30.00 10 Ka Kirstie  [id number]  [place] ([id number])  1/[place] 1.[place]   12/11/2019  1   12/11/2019  Alprazolam 0.25 Mg Tablet  30.00 10 Ka Kirstie  [id number]  [place] ([id number])  0/1 1.[place]   08/19/2019  1   07/01/2019  Alprazolam 0.25 Mg Tablet  30.00 10 Ka Kirstie  [id number]  [place] ([id number])  5/5 1.[place]   08/10/2019  1   07/01/2019  Alprazolam 0.25 Mg Tablet  30.00 10 Ka Kirstie  [id number]  [place] ([id number])  4/5 1.[place]   08/01/2019  1   07/01/2019  Alprazolam 0.25 Mg Tablet  30.00 10 Ka Kirstie  [id number]  [place] ([id number])  3/5 1.[place]   07/23/2019  1   07/01/2019  Alprazolam 0.25 Mg Tablet  30.00 10 Ka Kirstie  [id number]  [place] ([id number])  2/5 1.[place]

## 2020-03-24 RX ORDER — ALPRAZOLAM 0.25 MG
TABLET ORAL
Qty: 30 TABLET | Refills: 0 | Status: SHIPPED | OUTPATIENT
Start: 2020-03-24 | End: 2020-04-20

## 2020-05-13 DIAGNOSIS — F41.9 ANXIETY: ICD-10-CM

## 2020-05-13 NOTE — TELEPHONE ENCOUNTER
RX [place] ([place]) reviewed:  reviewed- no concerns    [place]:  [email or web address]    04/21/2020  2   04/21/2020  Alprazolam 0.25 Mg Tablet  10.00 4 Ka Kirstie  [id number]  [place] ([id number])  0/[place] 1.[place]   03/24/2020  1   03/24/2020  Alprazolam 0.25 Mg Tablet  30.00 10 Ka Kirstie  [id number]  [place] ([id number])  0/[place] 1.[place]   02/07/2020  1   02/07/2020  Alprazolam 0.25 Mg Tablet  30.00 10 Ka Kirstie  [id number]  [place] ([id number])  0/[place] 1.[place]   12/23/2019  1   12/11/2019  Alprazolam 0.25 Mg Tablet  30.00 10 Ka Kirstie  [id number]  [place] (4362)  1/1 1.[place]   12/11/2019  1   12/11/2019  Alprazolam 0.25 Mg Tablet  30.00 10 Ka Kirstie  [id number]  [place] (4362[place]  0/[place]   08/19/2019  1   07/01/2019  Alprazolam 0.25 Mg Tablet  30.00 10 Ka Kirstie  [id number]  [place] (4362)  5/5 1.[place]   08/10/2019  1   07/01/2019  Alprazolam 0.25 Mg Tablet  30.00 10 Ka Kirstie  [id number]  [place] ([id number])  4/5 1.[place]

## 2020-05-14 RX ORDER — ALPRAZOLAM 0.25 MG
TABLET ORAL
Qty: 10 TABLET | Refills: 0 | Status: SHIPPED | OUTPATIENT
Start: 2020-05-14 | End: 2020-06-18

## 2020-06-18 DIAGNOSIS — F33.1 MAJOR DEPRESSIVE DISORDER, RECURRENT EPISODE, MODERATE (H): ICD-10-CM

## 2020-06-18 DIAGNOSIS — F41.9 ANXIETY: ICD-10-CM

## 2020-06-18 RX ORDER — ALPRAZOLAM 0.25 MG
TABLET ORAL
Qty: 10 TABLET | Refills: 0 | Status: SHIPPED | OUTPATIENT
Start: 2020-06-18 | End: 2020-07-15

## 2020-06-18 RX ORDER — BUPROPION HYDROCHLORIDE 150 MG/1
TABLET, EXTENDED RELEASE ORAL
Qty: 90 TABLET | Refills: 1 | Status: SHIPPED | OUTPATIENT
Start: 2020-06-18 | End: 2020-10-13

## 2020-06-18 NOTE — TELEPHONE ENCOUNTER
Routing refill request to provider for review/approval because:  Drug not on the [facility] refill protocol     Laura CUELLAR RN, BSN

## 2020-07-08 ENCOUNTER — VIRTUAL VISIT (OUTPATIENT)
Dept: FAMILY MEDICINE | Facility: CLINIC | Age: 35
End: 2020-07-08
Payer: COMMERCIAL

## 2020-07-08 ENCOUNTER — TELEPHONE (OUTPATIENT)
Dept: FAMILY MEDICINE | Facility: CLINIC | Age: 35
End: 2020-07-08

## 2020-07-08 DIAGNOSIS — Z20.822 EXPOSURE TO COVID-19 VIRUS: ICD-10-CM

## 2020-07-08 DIAGNOSIS — J02.9 SORE THROAT: ICD-10-CM

## 2020-07-08 DIAGNOSIS — R50.9 FEVER AND CHILLS: Primary | ICD-10-CM

## 2020-07-08 PROCEDURE — 99213 OFFICE O/P EST LOW 20 MIN: CPT | Mod: 95 | Performed by: NURSE PRACTITIONER

## 2020-07-08 NOTE — TELEPHONE ENCOUNTER
S-(situation): sore throat, fatigue, did [email or web address] and was advised to see PCP for strep    B-(background): symptoms started on Monday    A-(assessment): patient called, he says that as of Monday he has been feeling ill, says he was so tired he slept for 12 hours, has sore throat but no spots on the back of throat and able to swallow, has been having a dry cough with some secretions, no breathing problems, feels like he has a fever but checks at work and he is not seeing a temp, feels ill. Patient has not been advised for COVID testing but has virtual lab appointment on 7-9-2020. The patient does not think he has strep throat.    R-(recommendations): patient has done [email or web address] and appointment for virtual lab, but discussed with Dr. Devine and Dr. Devine agrees patient should have video visit for ordering COVID testing. Patient is transferred to [facility] to schedule virtual visit with Alia blakely. Patient agrees with the plan.    ALEXANDRA Quintanilla                July 8, 2020

## 2020-07-08 NOTE — PROGRESS NOTES
"  SUBJECTIVE   Evangelist Macario is a  male who is being evaluated via a billable video visit.    The patient has been notified of following:   \"This video visit will be conducted via a call between you and your physician/provider. We have found that certain health care needs can be provided without the need for an in-person physical exam.  This service lets us provide the care you need with a video conversation.  If a prescription is necessary we can send it directly to your pharmacy.  If lab work is needed we can place an order for that and you can then stop by our lab to have the test done at a later time.    Video visits are billed at different rates depending on your insurance coverage.  Please reach out to your insurance provider with any questions.    If during the course of the call the physician/provider feels a video visit is not appropriate, you will not be charged for this service.\"    Patient has given verbal consent for Video visit? Yes    How would you like to obtain your AVS? [facility]    Patient would like the video invitation sent by: Text to cell phone: [phone]    Will anyone else be joining your video visit? 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 " Dr. Devine called to let me know that he also needs a strep test.    S-(situation): sore throat, fatigue, did [email or web address] and was advised to see PCP for strep     B-(background): symptoms started on Monday     A-(assessment): patient called, he says that as of Monday he has been feeling ill, says he was so tired he slept for 12 hours, has sore throat but no spots on the back of throat and able to swallow, has been having a dry cough with some secretions, no breathing problems, feels like he has a fever but checks at work and he is not seeing a temp, feels ill. Patient has not been advised for COVID testing but has virtual lab appointment on 7-9-2020. The patient does not think he has strep throat.     R-(recommendations): patient has done [email or web address] and appointment for virtual lab, but discussed with Dr. Devine and Dr. Devine agrees patient should have video visit for ordering COVID testing. Patient is transferred to [facility] to schedule virtual visit with Alia blakely. Patient agrees with the plan.     ALEXANDRA Quintanilla     Video Start Time:   Start: 07/08/2020 01:29 pm   Stop: 07/08/2020 01:43 pm    PCP   [facility] [phone]    Health Maintenance        Health Maintenance Due   Topic Date Due     PREVENTIVE CARE VISIT  1986     HIV SCREENING  08/21/2001     DTAP/TDAP/TD IMMUNIZATION (4 - Td) 07/15/2017     PHQ-9  06/04/2020        PROBLEM LIST        Patient Active Problem List   Diagnosis     Tobacco use disorder     CARDIOVASCULAR SCREENING; LDL GOAL LESS THAN 160     Moderate Depression [296.32]     Anxiety     Attention deficit hyperactivity disorder (ADHD), predominantly inattentive type       MEDICATIONS        Current Outpatient Medications   Medication     ALPRAZolam (XANAX) 0.25 MG tablet     buPROPion (WELLBUTRIN SR) 150 MG 12 hr tablet     escitalopram (LEXAPRO) 20 MG tablet     amphetamine-dextroamphetamine (ADDERALL XR) 20 MG per 24 hr capsule     lisinopril (PRINIVIL/ZESTRIL) 20 MG  tablet     No current facility-administered medications for this visit.        Reviewed and updated as needed this visit by Provider:  Tobacco  Allergies  Meds  Med Hx  Surg Hx  Fam Hx  Soc Hx     ROS      Constitutional, HEENT, cardiovascular, pulmonary, gi and gu systems are negative, except as otherwise noted.      PHYSICAL EXAM   There were no vitals taken for this visit  No LMP for male patient.    GENERAL: Healthy, alert and no distress  RESP: no audible wheeze, cough, or visible cyanosis.  No visible retractions or increased work of breathing.  Able to speak fully in complete sentences.  PSYCH: mentation appears normal, affect normal/bright, judgement and insight intact, normal speech and appearance well-groomed      ASSESSMENT & PLAN        1. Fever and chills  Acute, stable  - Symptomatic COVID-19 Virus (Coronavirus) by PCR; Future  - Streptococcus A Rapid Scr w Reflx to PCR; Future  Patient declines note for work  He will quarantine at home    2. Sore throat  Acute, stable  - Symptomatic COVID-19 Virus (Coronavirus) by PCR; Future  - Streptococcus A Rapid Scr w Reflx to PCR; Future    3. Exposure to COVID-19 virus  - Symptomatic COVID-19 Virus (Coronavirus) by PCR; Future  - Streptococcus A Rapid Scr w Reflx to PCR; Future    Patient Education     Coronavirus Disease 2019 (COVID-19): Caring for Yourself or Others  If you or a household member have symptoms of COVID-19, follow the guidelines below. This will help you manage symptoms and keep the virus from spreading.  If you have symptoms of COVID-19    Stay home and contact your care team. They will tell you what to do.    Don t panic. Keep in mind that other illnesses can cause similar symptoms.    Stay away from work, school, and public places.    Limit physical contact with others in your home. Limit visitors. No kissing.    Clean surfaces you touch with disinfectant.    If you need to cough or sneeze, do it into a tissue. Then, throw the tissue  into the trash. If you don't have tissues, cough or sneeze into the bend of your elbow    Don t share food or personal items with people in your household. This includes items like eating and drinking utensils, towels, and bedding.    Wear a cloth face mask around other people. It should cover your nose and mouth. You may need to make your own mask using a bandana, T-shirt, or other cloth. See the CDC s instructions on how to make a mask.    If you need to go to a hospital or clinic, call ahead to let them know. Expect the care team to wear masks, gowns, gloves, and eye protection. You may be put in a separate room.    Follow all instructions from your care team.  If you ve been diagnosed with COVID-19    Stay home and away from others, including other people in your home. (This is called self-isolation.) Don t leave home unless you need to get medical care. Don't go to work, school, or public places. Don't use buses, taxis, or other public transportation.    Follow all instructions from your care team.    If you need to go to a hospital or clinic, call ahead to let them know. Expect the care team to wear masks, gowns, gloves, and eye protection. You may be put in a separate room.    Wear a face mask over your nose and mouth. This is to protect others from your germs. If you can t wear a mask, your caregivers should wear one. You may need to make your own mask using a bandana, T-shirt, or other cloth. See the CDC s instructions on how to make a mask.    Have no contact with pets and other animals.    Don t share food or personal items with people in your household. This includes items like eating and drinking utensils, towels, and bedding.    If you need to cough or sneeze, do it into a tissue. Then, throw the tissue into the trash. If you don't have tissues, cough or sneeze into the bend of your elbow.    Wash your hands often.  Self-care at home  At this time, there is no medicine approved to prevent or treat  COVID-19. Experts are testing different medicines, trying to find one that works.  So far, the most proven treatment is to support your body while it fights the virus.    Get plenty of rest.    Drink extra fluids (6 to 8 glasses of liquids each day), unless a doctor has told you not to. Ask your care team which fluids are best for you. Avoid fluids that contain caffeine or alcohol.    Take over-the-counter (OTC) medicine to help reduce pain and fever. Your care team will tell you which OTC medicine to use.  If you ve been in the hospital for COVID-19, follow your care team s instructions. This may include changing positions to help your breathing (such as lying on your belly).  If a test showed that you have COVID-19, you may be asked to donate plasma after you ve recovered. (This is called COVID-19 convalescent plasma donation.) The plasma may contain antibodies to help fight the virus in others. Scientists are testing whether this might be a treatment in the future. For more information, talk to your care team.  Caring for a sick person    Follow all instructions from the care team.    Wash your hands often.    Wear protective clothing as advised.    Make sure the sick person wears a mask. If they can't wear a mask, don't stay in the same room with them. If you must be in the same room, wear a face mask. Make sure the mask covers both the nose and mouth.    Keep track of the sick person s symptoms.    Clean surfaces often with disinfectant. This includes phones, kitchen counters, fridge door handles, bathroom surfaces, and others.    Don t let anyone share household items with the sick person. This includes eating and drinking tools, towels, sheets, and blankets.    Clean fabrics and laundry well.    Keep other people and pets away from the sick person.  When you can stop self-isolation  When you are sick with COVID-19, you should stay away from other people. This is called self-isolation. The rules for ending  self-isolation depend on your health in general.  If you are normally healthy  You can stop self-isolation when all 3 of these are true:    You ve had no fever--and no medicine that reduces fever--for 3 full days (72 hours). And     Your symptoms are better, such as cough or trouble breathing. And     At least 10 days have passed since your symptoms first started.  Talk with your care team before you leave home. They may tell you it s okay to leave, or they may give you different advice.  If you have a weak immune system  If you re being treated for cancer, have an immune disorder (such as HIV), or have had a transplant (organ or bone marrow), follow your care team s instructions. You may be able to end self-isolation when all 3 of these are true:    You ve had no fever--and no medicine that reduces fever--for 3 full days (72 hours). And     Your symptoms are better, such as cough or trouble breathing. And     You ve had 2 tests for COVID-19. The tests happened at least 24 hours apart, and both show that you don t have the virus. (If no tests are available, your care team may tell you to follow the rules for normally healthy people above.)  When to call your care team  Call your care team right away if a sick person has any of these:    Trouble breathing    Pain or pressure in the chest  If a sick person has any of these, call 911:    Trouble breathing that gets worse    Pain or pressure in the chest that gets worse    Blue tint to lips or face    Fast or irregular heartbeat    Confusion or trouble waking    Fainting or loss of consciousness    Coughing up blood  Going home from the hospital  If you have COVID-19 and were recently in the hospital:    Follow the instructions above for self-care and isolation.    Follow the hospital care team s instructions.    Ask questions if anything is unclear to you. Write down answers so you remember them.  Last modified date: 5/8/2020  This information has been modified by your  health care provider with permission from the publisher.      [id number] [facility], [address] [place] [address], [place] [address]. All rights reserved. This information is not intended as a substitute for professional medical care. Always follow your healthcare professional's instructions. This information has been modified by your health care provider with permission from the publisher.           Patient Education     [employer] Video Sheets  How COVID-19 Spreads  The COVID-19 virus emerged in [place] in 2019. Since then, it spread around the world. But how is this possible? Let's learn about how this virus spreads.  To watch the video:  Scan the QR code  Using your mobile device, scan the following code:       OR  Go to the website:  wwwStatusNet  Enter the prescription code:   UVA     2020 [place]           Patient Education     [employer] Video Sheets  Disinfecting Your Home of COVID-19  Someone in your household has a COVID-19 infection. You're worried it could spread to you or other family members. Let's learn how to clean your home and lower your risk of infection.  To watch the video:  Scan the QR code  Using your mobile device, scan the following code:       OR  Go to the website:  www.kramesvideo.com  Enter the prescription code:   56L     2020 [employer].         COVID-19    Symptoms    The COVID-19 can cause a respiratory illness, such as bronchitis or pneumonia.    The most common symptoms are: cough, fever, and shortness of breath.     Other symptoms are: body aches, chills, diarrhea, fatigue, headache, runny nose, and sore throat     Exposure Risk Factors:    Exposure to a person who has been diagnosed with COVID-19 .    Travel from an area with recent local transmission of COVID-19 .    The CDC (www.cdc.gov) has the most up-to-date list of where the COVID-19 outbreak is occurring.    Spreading:     The virus likely spreads through respiratory droplets produced when a person  coughs or sneezes. These respiratory droplets can travel approximately 6 feet and can remain on surfaces.  Common disinfectants will kill the virus.    The CDC currently does not recommend healthy people wearing masks.    Protect Yourself:     Avoid close contact with people known to have COVID19 infection.    Wash hands often with soap and water for 20 seconds or alcohol-based hand .    Avoid touching the eyes, nose or mouth.     If you become sick you can reference the following CDC link for helpful home isolation/care tips:  https://www.cdc.gov/coronavirus/2019-ncov/downloads/10Things.pdf    For more information about COVID19 and options for caring for yourself at home, please visit the CDC website at https://www.cdc.gov/coronavirus/2019-ncov/about/steps-when-sick.html    How to protect yourself against COVID-19  Know How it Spreads    There is currently no vaccine to prevent COVID-19.    The best way to prevent illness is to avoid being exposed to this virus.    The virus is thought to spread mainly from person-to-person.   o Between people who are in close contact with one another (within about 6 feet).  o Through respiratory droplets produced when an infected person coughs or sneezes.    These droplets can land in the mouths or noses of people who are nearby or possibly be inhaled into the lungs.    Take steps to protect yourself  Clean your hands often    Wash your hands often with soap and water for at least 20 seconds especially after you have been in a public place, or after blowing your nose, coughing, or sneezing.    If soap and water are not readily available, use a hand  that contains at least 60% alcohol. Cover all surfaces of your hands and rub them together until they feel dry.    Avoid touching your eyes, nose, and mouth with unwashed hands.  Avoid close contact    Avoid close contact with people who are sick. Recommendation is 6 feet apart.    Put distance between yourself and  other people if COVID-19 is spreading in your community. This is especially important for people who are at higher risk of getting very sick.           Take steps to protect others  Stay home if you're sick    Stay home if you are sick, except to get medical care. Learn what to do if you are sick.  Cover coughs and sneezes    Cover your mouth and nose with a tissue when you cough or sneeze or use the inside of your elbow.    Throw used tissues in the trash.    Immediately wash your hands with soap and water for at least 20 seconds. If soap and water are not readily available, clean your hands with a hand  that contains at least 60% alcohol.  Wear a facemask if you are sick    If you are sick: You should wear a facemask when you are around other people (e.g., sharing a room or vehicle) and before you enter a healthcare provider's office. If you are not able to wear a facemask (for example, because it causes trouble breathing), then you should do your best to cover your coughs and sneezes, and people who are caring for you should wear a facemask if they enter your room. Learn what to do if you are sick.    If you are NOT sick: You do not need to wear a facemask unless you are caring for someone who is sick (and they are not able to wear a facemask). Facemasks may be in short supply and they should be saved for caregivers.  Clean and disinfect    Clean AND disinfect frequently touched surfaces daily. This includes tables, doorknobs, light switches, countertops, handles, desks, phones, keyboards, toilets, faucets, and sinks.    If surfaces are dirty, clean them: Use detergent or soap and water prior to disinfection.    To disinfect:  Most common EPA-registered household disinfectants will work. Use disinfectants appropriate for the surface.  Options include:     Diluting your household bleach.  To make a bleach solution, mix:   o 5 tablespoons (1/3rd cup) bleach per gallon of water  OR  o 4 teaspoons bleach per  quart of water  Follow 's instructions for application and proper ventilation. Check to ensure the product is not past its expiration date. Never mix household bleach with ammonia or any other cleanser. Unexpired household bleach will be effective against coronaviruses when properly diluted.     Alcohol solutions.  Ensure solution has at least 70% alcohol.    Other common EPA-registered household disinfectants.   https://www.epa.gov/pesticide-registration/list-n-disinfectants-use-against-sars-cov-2    Follow the 's instructions for all cleaning and disinfection products (e.g., concentration, application method and contact time, etc.).    COVID resources  Centers for Disease Control   https://www.cdc.gov/coronavirus/2019-ncov/index.html  -Signs/symptoms/transmission   -Slowing the spread   -Self-     [facility]   [email or web address]/[email or web address]  -Signs/symptoms/transmission   -Protecting you and your family   -COVID-19 Hotline (Interpreters available)     World Health Organization (WHO)   -Overview   -Prevention   -Symptoms     [place]  [email or web address]  - Overview    For more options for care at [facility], please visit our website at:  [email or web address]         Risks, benefits, side effects and rationale for treatment plan fully discussed with the patient and understanding expressed.  SANJAY [facility]    Video-Visit Details  Type of service:  Video Visit    Video End Time:   Stop: 07/08/2020 01:43 pm  Ended call with 3 minutes of TELEPHONE visit because his audio cut out.     Originating Location (pt. Location): Home    Distant Location (provider location):  [facility]    Platform used for Video Visit: Unable to complete video visit     Follow-up: see Assessment & Plan

## 2020-07-08 NOTE — TELEPHONE ENCOUNTER
Reason for Call:  Other     Detailed comments: Patient did [email or web address] and was told it's probably strep go to PCP - Patient doesn't think its just strep.  His throat hurts - Body aches - No fever- Feels Cold but sweating - Sleeping a lot - Slept yesterday from 5 until morning - please advise    Phone Number Patient can be reached at: Home number on file [phone] (home)    Best Time:     Can we leave a detailed message on this number? YES    Call taken on 7/8/2020 at 11:35 AM by Vandana Echols

## 2020-07-08 NOTE — PATIENT INSTRUCTIONS
1. Fever and chills  Acute, stable  - Symptomatic COVID-19 Virus (Coronavirus) by PCR; Future  - Streptococcus A Rapid Scr w Reflx to PCR; Future    2. Sore throat  Acute, stable  - Symptomatic COVID-19 Virus (Coronavirus) by PCR; Future  - Streptococcus A Rapid Scr w Reflx to PCR; Future    3. Exposure to COVID-19 virus  - Symptomatic COVID-19 Virus (Coronavirus) by PCR; Future  - Streptococcus A Rapid Scr w Reflx to PCR; Future    Patient Education     Coronavirus Disease 2019 (COVID-19): Caring for Yourself or Others  If you or a household member have symptoms of COVID-19, follow the guidelines below. This will help you manage symptoms and keep the virus from spreading.  If you have symptoms of COVID-19    Stay home and contact your care team. They will tell you what to do.    Don t panic. Keep in mind that other illnesses can cause similar symptoms.    Stay away from work, school, and public places.    Limit physical contact with others in your home. Limit visitors. No kissing.    Clean surfaces you touch with disinfectant.    If you need to cough or sneeze, do it into a tissue. Then, throw the tissue into the trash. If you don't have tissues, cough or sneeze into the bend of your elbow    Don t share food or personal items with people in your household. This includes items like eating and drinking utensils, towels, and bedding.    Wear a cloth face mask around other people. It should cover your nose and mouth. You may need to make your own mask using a bandana, T-shirt, or other cloth. See the CDC s instructions on how to make a mask.    If you need to go to a hospital or clinic, call ahead to let them know. Expect the care team to wear masks, gowns, gloves, and eye protection. You may be put in a separate room.    Follow all instructions from your care team.  If you ve been diagnosed with COVID-19    Stay home and away from others, including other people in your home. (This is called self-isolation.) Don t  leave home unless you need to get medical care. Don't go to work, school, or public places. Don't use buses, taxis, or other public transportation.    Follow all instructions from your care team.    If you need to go to a hospital or clinic, call ahead to let them know. Expect the care team to wear masks, gowns, gloves, and eye protection. You may be put in a separate room.    Wear a face mask over your nose and mouth. This is to protect others from your germs. If you can t wear a mask, your caregivers should wear one. You may need to make your own mask using a bandana, T-shirt, or other cloth. See the CDC s instructions on how to make a mask.    Have no contact with pets and other animals.    Don t share food or personal items with people in your household. This includes items like eating and drinking utensils, towels, and bedding.    If you need to cough or sneeze, do it into a tissue. Then, throw the tissue into the trash. If you don't have tissues, cough or sneeze into the bend of your elbow.    Wash your hands often.  Self-care at home  At this time, there is no medicine approved to prevent or treat COVID-19. Experts are testing different medicines, trying to find one that works.  So far, the most proven treatment is to support your body while it fights the virus.    Get plenty of rest.    Drink extra fluids (6 to 8 glasses of liquids each day), unless a doctor has told you not to. Ask your care team which fluids are best for you. Avoid fluids that contain caffeine or alcohol.    Take over-the-counter (OTC) medicine to help reduce pain and fever. Your care team will tell you which OTC medicine to use.  If you ve been in the hospital for COVID-19, follow your care team s instructions. This may include changing positions to help your breathing (such as lying on your belly).  If a test showed that you have COVID-19, you may be asked to donate plasma after you ve recovered. (This is called COVID-19 convalescent  plasma donation.) The plasma may contain antibodies to help fight the virus in others. Scientists are testing whether this might be a treatment in the future. For more information, talk to your care team.  Caring for a sick person    Follow all instructions from the care team.    Wash your hands often.    Wear protective clothing as advised.    Make sure the sick person wears a mask. If they can't wear a mask, don't stay in the same room with them. If you must be in the same room, wear a face mask. Make sure the mask covers both the nose and mouth.    Keep track of the sick person s symptoms.    Clean surfaces often with disinfectant. This includes phones, kitchen counters, fridge door handles, bathroom surfaces, and others.    Don t let anyone share household items with the sick person. This includes eating and drinking tools, towels, sheets, and blankets.    Clean fabrics and laundry well.    Keep other people and pets away from the sick person.  When you can stop self-isolation  When you are sick with COVID-19, you should stay away from other people. This is called self-isolation. The rules for ending self-isolation depend on your health in general.  If you are normally healthy  You can stop self-isolation when all 3 of these are true:    You ve had no fever--and no medicine that reduces fever--for 3 full days (72 hours). And     Your symptoms are better, such as cough or trouble breathing. And     At least 10 days have passed since your symptoms first started.  Talk with your care team before you leave home. They may tell you it s okay to leave, or they may give you different advice.  If you have a weak immune system  If you re being treated for cancer, have an immune disorder (such as HIV), or have had a transplant (organ or bone marrow), follow your care team s instructions. You may be able to end self-isolation when all 3 of these are true:    You ve had no fever--and no medicine that reduces fever--for 3  full days (72 hours). And     Your symptoms are better, such as cough or trouble breathing. And     You ve had 2 tests for COVID-19. The tests happened at least 24 hours apart, and both show that you don t have the virus. (If no tests are available, your care team may tell you to follow the rules for normally healthy people above.)  When to call your care team  Call your care team right away if a sick person has any of these:    Trouble breathing    Pain or pressure in the chest  If a sick person has any of these, call 911:    Trouble breathing that gets worse    Pain or pressure in the chest that gets worse    Blue tint to lips or face    Fast or irregular heartbeat    Confusion or trouble waking    Fainting or loss of consciousness    Coughing up blood  Going home from the hospital  If you have COVID-19 and were recently in the hospital:    Follow the instructions above for self-care and isolation.    Follow the hospital care team s instructions.    Ask questions if anything is unclear to you. Write down answers so you remember them.  Last modified date: 5/8/2020  This information has been modified by your health care provider with permission from the publisher.      [address] [place] [address]. All rights reserved. This information is not intended as a substitute for professional medical care. Always follow your healthcare professional's instructions. This information has been modified by your health care provider with permission from the publisher.           Patient Education     [employer] Video Sheets  How COVID-19 Spreads  The COVID-19 virus emerged in [place] in 2019. Since then, it spread around the world. But how is this possible? Let's learn about how this virus spreads.  To watch the video:  Scan the QR code  Using your mobile device, scan the following code:       OR  Go to the website:  www.[employer]  Enter the prescription code:   UVA     2020 [employer]           Patient Education     [employer] Video Sheets  Disinfecting Your Home of COVID-19  Someone in your household has a COVID-19 infection. You're worried it could spread to you or other family members. Let's learn how to clean your home and lower your risk of infection.  To watch the video:  Scan the QR code  Using your mobile device, scan the following code:       OR  Go to the website:  [email or web address].[email or web address]  Enter the prescription code:   56L     2020 [employer]         COVID-19    Symptoms    The COVID-19 can cause a respiratory illness, such as bronchitis or pneumonia.    The most common symptoms are: cough, fever, and shortness of breath.     Other symptoms are: body aches, chills, diarrhea, fatigue, headache, runny nose, and sore throat     Exposure Risk Factors:    Exposure to a person who has been diagnosed with COVID-19 .    Travel from an area with recent local transmission of COVID-19 .    The CDC (www.cdc.gov) has the most up-to-date list of where the COVID-19 outbreak is occurring.    Spreading:     The virus likely spreads through respiratory droplets produced when a person coughs or sneezes. These respiratory droplets can travel approximately 6 feet and can remain on surfaces.  Common disinfectants will kill the virus.    The CDC currently does not recommend healthy people wearing masks.    Protect Yourself:     Avoid close contact with people known to have COVID19 infection.    Wash hands often with soap and water for 20 seconds or alcohol-based hand .    Avoid touching the eyes, nose or mouth.     If you become sick you can reference the following CDC link for helpful home isolation/care tips:  https://www.cdc.gov/coronavirus/2019-ncov/downloads/10Things.pdf    For more information about COVID19 and options for caring for yourself at home, please visit the CDC website at https://www.cdc.gov/coronavirus/2019-ncov/about/steps-when-sick.html    How to protect yourself  against COVID-19  Know How it Spreads    There is currently no vaccine to prevent COVID-19.    The best way to prevent illness is to avoid being exposed to this virus.    The virus is thought to spread mainly from person-to-person.   o Between people who are in close contact with one another (within about 6 feet).  o Through respiratory droplets produced when an infected person coughs or sneezes.    These droplets can land in the mouths or noses of people who are nearby or possibly be inhaled into the lungs.    Take steps to protect yourself  Clean your hands often    Wash your hands often with soap and water for at least 20 seconds especially after you have been in a public place, or after blowing your nose, coughing, or sneezing.    If soap and water are not readily available, use a hand  that contains at least 60% alcohol. Cover all surfaces of your hands and rub them together until they feel dry.    Avoid touching your eyes, nose, and mouth with unwashed hands.  Avoid close contact    Avoid close contact with people who are sick. Recommendation is 6 feet apart.    Put distance between yourself and other people if COVID-19 is spreading in your community. This is especially important for people who are at higher risk of getting very sick.           Take steps to protect others  Stay home if you're sick    Stay home if you are sick, except to get medical care. Learn what to do if you are sick.  Cover coughs and sneezes    Cover your mouth and nose with a tissue when you cough or sneeze or use the inside of your elbow.    Throw used tissues in the trash.    Immediately wash your hands with soap and water for at least 20 seconds. If soap and water are not readily available, clean your hands with a hand  that contains at least 60% alcohol.  Wear a facemask if you are sick    If you are sick: You should wear a facemask when you are around other people (e.g., sharing a room or vehicle) and before you  enter a healthcare provider's office. If you are not able to wear a facemask (for example, because it causes trouble breathing), then you should do your best to cover your coughs and sneezes, and people who are caring for you should wear a facemask if they enter your room. Learn what to do if you are sick.    If you are NOT sick: You do not need to wear a facemask unless you are caring for someone who is sick (and they are not able to wear a facemask). Facemasks may be in short supply and they should be saved for caregivers.  Clean and disinfect    Clean AND disinfect frequently touched surfaces daily. This includes tables, doorknobs, light switches, countertops, handles, desks, phones, keyboards, toilets, faucets, and sinks.    If surfaces are dirty, clean them: Use detergent or soap and water prior to disinfection.    To disinfect:  Most common EPA-registered household disinfectants will work. Use disinfectants appropriate for the surface.  Options include:     Diluting your household bleach.  To make a bleach solution, mix:   o 5 tablespoons (1/3rd cup) bleach per gallon of water  OR  o 4 teaspoons bleach per quart of water  Follow 's instructions for application and proper ventilation. Check to ensure the product is not past its expiration date. Never mix household bleach with ammonia or any other cleanser. Unexpired household bleach will be effective against coronaviruses when properly diluted.     Alcohol solutions.  Ensure solution has at least 70% alcohol.    Other common EPA-registered household disinfectants.   https://www.epa.gov/pesticide-registration/list-n-disinfectants-use-against-sars-cov-2    Follow the 's instructions for all cleaning and disinfection products (e.g., concentration, application method and contact time, etc.).    COVID resources  Centers for Disease Control   https://www.cdc.gov/coronavirus/2019-ncov/index.html  -Signs/symptoms/transmission   -Slowing the  spread   -Self-     [facility]   Https://[email or web address].mn.us/diseases/coronavirus/index.html  -Signs/symptoms/transmission   -Protecting you and your family   -COVID-19 Hotline (Interpreters available)     World Health Organization (WHO)   -Overview   -Prevention   -Symptoms     [facility]  [email or web address]  - Overview    For more options for care at [facility], please visit our website at:  [email or web address]

## 2020-07-09 ENCOUNTER — MYC MEDICAL ADVICE (OUTPATIENT)
Dept: FAMILY MEDICINE | Facility: CLINIC | Age: 35
End: 2020-07-09

## 2020-07-10 ENCOUNTER — MYC MEDICAL ADVICE (OUTPATIENT)
Dept: FAMILY MEDICINE | Facility: CLINIC | Age: 35
End: 2020-07-10

## 2020-07-11 ENCOUNTER — HOSPITAL ENCOUNTER (EMERGENCY)
Facility: CLINIC | Age: 35
Discharge: HOME | End: 2020-07-11
Attending: PHYSICIAN ASSISTANT | Admitting: PHYSICIAN ASSISTANT
Payer: COMMERCIAL

## 2020-07-11 ENCOUNTER — APPOINTMENT (OUTPATIENT)
Dept: GENERAL RADIOLOGY | Facility: CLINIC | Age: 35
End: 2020-07-11
Attending: PHYSICIAN ASSISTANT
Payer: COMMERCIAL

## 2020-07-11 VITALS
WEIGHT: 230 LBS | OXYGEN SATURATION: 96 % | BODY MASS INDEX: 30.14 KG/M2 | TEMPERATURE: 98.2 F | SYSTOLIC BLOOD PRESSURE: 152 MMHG | DIASTOLIC BLOOD PRESSURE: 110 MMHG | RESPIRATION RATE: 20 BRPM | HEART RATE: 94 BPM

## 2020-07-11 DIAGNOSIS — Z20.822 SUSPECTED COVID-19 VIRUS INFECTION: ICD-10-CM

## 2020-07-11 DIAGNOSIS — I10 HYPERTENSION: ICD-10-CM

## 2020-07-11 DIAGNOSIS — R05.9 COUGH: ICD-10-CM

## 2020-07-11 DIAGNOSIS — R50.9 FEVER: ICD-10-CM

## 2020-07-11 PROCEDURE — C9803 HOPD COVID-19 SPEC COLLECT: HCPCS | Performed by: PHYSICIAN ASSISTANT

## 2020-07-11 PROCEDURE — 99284 EMERGENCY DEPT VISIT MOD MDM: CPT | Mod: Z6 | Performed by: PHYSICIAN ASSISTANT

## 2020-07-11 PROCEDURE — U0003 INFECTIOUS AGENT DETECTION BY NUCLEIC ACID (DNA OR RNA); SEVERE ACUTE RESPIRATORY SYNDROME CORONAVIRUS 2 (SARS-COV-2) (CORONAVIRUS DISEASE [COVID-19]), AMPLIFIED PROBE TECHNIQUE, MAKING USE OF HIGH THROUGHPUT TECHNOLOGIES AS DESCRIBED BY CMS-2020-01-R: HCPCS | Performed by: PHYSICIAN ASSISTANT

## 2020-07-11 PROCEDURE — 71045 X-RAY EXAM CHEST 1 VIEW: CPT

## 2020-07-11 PROCEDURE — 99284 EMERGENCY DEPT VISIT MOD MDM: CPT | Mod: 25 | Performed by: PHYSICIAN ASSISTANT

## 2020-07-11 ASSESSMENT — ENCOUNTER SYMPTOMS
RHINORRHEA: 0
FEVER: 1
PSYCHIATRIC NEGATIVE: 1
EYES NEGATIVE: 1
SORE THROAT: 0
DIZZINESS: 0
WHEEZING: 0
CARDIOVASCULAR NEGATIVE: 1
FATIGUE: 1
COUGH: 1
HEADACHES: 0
APPETITE CHANGE: 0
VOICE CHANGE: 0
LIGHT-HEADEDNESS: 0
GASTROINTESTINAL NEGATIVE: 1
TROUBLE SWALLOWING: 0
SHORTNESS OF BREATH: 1
WEAKNESS: 0
MUSCULOSKELETAL NEGATIVE: 1
NUMBNESS: 0
CHEST TIGHTNESS: 0
ACTIVITY CHANGE: 0

## 2020-07-11 NOTE — ED AVS SNAPSHOT
[facility] Emergency Department  [address] [address]  Phone:  [phone]  Fax:  [phone]                                    Evangelist Macario   MRN: [id number]    Department:  [facility] Emergency Department   Date of Visit:  7/11/2020           After Visit Summary Signature Page    I have received my discharge instructions, and my questions have been answered. I have discussed any challenges I see with this plan with the nurse or doctor.    ..........................................................................................................................................  Patient/Patient Representative Signature      ..........................................................................................................................................  Patient Representative Print Name and Relationship to Patient    ..................................................               ................................................  Date                                   Time    ..........................................................................................................................................  Reviewed by Signature/Title    ...................................................              ..............................................  Date                                               Time          22EPIC Rev 08/18

## 2020-07-11 NOTE — DISCHARGE INSTRUCTIONS
Increase fluids, rest, Tylenol over-the-counter as needed for symptoms.    Covid-19 test sent and currently pending.  Patient to continue to keep himself at home and avoid family members and other friends until test results come back.  Patient cannot return to work until test results come back till he is fever free for 3 days.    Patient to follow-up with primary care doctor on Monday for recheck of symptoms and to get his blood pressure medications refilled/adjusted    Patient to return to the emergency department sooner if persistent fevers occur, productive cough, chest pain, shortness of breath, or worsening symptoms occur.

## 2020-07-11 NOTE — ED TRIAGE NOTES
Pt is an  and enters other [employer] houses and is unsure if he has had contact but has lost his taste and smell as well as having night sweats and cough since Monday evening.

## 2020-07-11 NOTE — ED PROVIDER NOTES
History     Chief Complaint   Patient presents with     Fever     Cough     Loss of taste and smell     HPI  Evangelist Macario is a 34 year old male who presents to the Emergency Room today with 5 days of cough, on and off fevers, loss of taste and smell, fatigue, sore throat, myalgias, and some shortness of breath with exertion.  Patient states fever got as high as 102, currently patient is afebrile.  Patient states cough started off more productive but now is currently dry and sore throat has resolved.  Patient had a virtual visit on 7/8/20 and at that time had orders placed for strep throat and COVID-19 swabs, but did not have these done yet.  Patient states he came in today because of the persistent fevers.  Patient has has been taking Tylenol and ibuprofen for his symptoms.  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  Patient denies any history of asthma, diabetes, heart disease, autoimmune issues, and states that he smokes occasionally.  Patient denies drug use. Patient states overall his symptoms seem to be improving some, but the fevers on and off that are worse at night made him come in to be evaluated.     Allergies:  No Known Allergies    Problem List:    Patient Active Problem List    Diagnosis Date Noted     Attention deficit hyperactivity disorder (ADHD), predominantly inattentive type 07/20/2018     Priority: Medium     Anxiety 05/10/2012     Priority: Medium     Moderate Depression [296.32] 01/30/2012     Priority: Medium     CARDIOVASCULAR SCREENING; LDL GOAL LESS THAN 160 10/31/2010     Priority: Medium     Tobacco use disorder 12/09/2008     Priority: Medium        Past Medical History:    History reviewed. No pertinent past medical history.    Past Surgical History:    Past Surgical History:   Procedure Laterality Date     SURGICAL HISTORY OF -   3/25/2004    Right Inguinal Herniorrhaphy with mesh     SURGICAL HISTORY OF -    1998    Open Appendectomy     SURGICAL HISTORY OF -   1998    Open Left inguinal Heerniorrhaphy.       Family History:    Family History   Problem Relation Age of Onset     Diabetes Brother      Cancer Father         pancreatic       Social History:  Marital Status:  Single [1]  Social History     Tobacco Use     Smoking status: Former Smoker     Packs/day: 0.50     Years: 1.00     Pack years: 0.50     Types: Cigarettes     Last attempt to quit: 2013     Years since quittin.1     Smokeless tobacco: Never Used   Substance Use Topics     Alcohol use: Yes     Comment: once in while     Drug use: No        Medications:    ALPRAZolam (XANAX) 0.25 MG tablet  amphetamine-dextroamphetamine (ADDERALL XR) 20 MG per 24 hr capsule  buPROPion (WELLBUTRIN SR) 150 MG 12 hr tablet  escitalopram (LEXAPRO) 20 MG tablet  lisinopril (PRINIVIL/ZESTRIL) 20 MG tablet      Review of Systems   Constitutional: Positive for fatigue and fever. Negative for activity change and appetite change.   HENT: Negative for congestion, ear pain, postnasal drip, rhinorrhea, sore throat (positive sore throat earlier, but nothing today. ), trouble swallowing and voice change.    Eyes: Negative.    Respiratory: Positive for cough and shortness of breath (occasionally with exertion. ). Negative for chest tightness and wheezing.    Cardiovascular: Negative.    Gastrointestinal: Negative.    Genitourinary: Negative.    Musculoskeletal: Negative.    Skin: Negative.    Neurological: Negative for dizziness, weakness, light-headedness, numbness and headaches.   Psychiatric/Behavioral: Negative.    All other systems reviewed and are negative.      Physical Exam   BP: (!) 159/103  Pulse: 94  Temp: 98.2  F (36.8  C)  Resp: 20  Weight: 104.3 kg (230 lb)  SpO2: 96 %      Physical Exam  Vitals signs and nursing note reviewed.   Constitutional:       General: He is not in acute distress.     Appearance: He is normal weight. He is not ill-appearing or  toxic-appearing.   HENT:      Head: Normocephalic and atraumatic.      Right Ear: Tympanic membrane and ear canal normal.      Left Ear: Tympanic membrane and ear canal normal.      Nose: Nose normal.      Mouth/Throat:      Lips: Pink.      Mouth: Mucous membranes are moist.      Pharynx: Oropharynx is clear. No oropharyngeal exudate or posterior oropharyngeal erythema.      Tonsils: No tonsillar exudate or tonsillar abscesses. 0 on the right. 0 on the left.   Eyes:      Extraocular Movements: Extraocular movements intact.      Conjunctiva/sclera: Conjunctivae normal.      Pupils: Pupils are equal, round, and reactive to light.   Neck:      Musculoskeletal: Normal range of motion and neck supple. No neck rigidity or muscular tenderness.   Cardiovascular:      Rate and Rhythm: Normal rate and regular rhythm.      Heart sounds: Normal heart sounds.   Pulmonary:      Effort: Pulmonary effort is normal. No respiratory distress.      Breath sounds: No stridor. No wheezing or rhonchi.      Comments: Slight course breath sounds to the left lower lobe.   Chest:      Chest wall: No tenderness.   Lymphadenopathy:      Cervical: No cervical adenopathy.   Skin:     General: Skin is warm.      Findings: No rash.   Neurological:      General: No focal deficit present.      Mental Status: He is alert and oriented to person, place, and time.   Psychiatric:         Mood and Affect: Mood normal.         Behavior: Behavior normal.         Thought Content: Thought content normal.         Judgment: Judgment normal.         ED Course        Procedures              Critical Care time:  none               Results for orders placed or performed during the hospital encounter of 07/11/20 (from the past 24 hour(s))   XR Chest Port 1 View    Narrative    CHEST PORTABLE ONE VIEW   7/11/2020 5:46 PM     HISTORY: Cough, rule out pneumonia.    COMPARISON: None available.      Impression    IMPRESSION: No acute airspace infiltrate.    AHMAD  MD CODY       Medications - No data to display    Assessments & Plan (with Medical Decision Making)     I have reviewed the nursing notes.    I have reviewed the findings, diagnosis, plan and need for follow up with the patient.    Evangelist Macario is a 34 year old male who presents to the Emergency Room today with 5 days of cough, on and off fevers, loss of taste and smell, fatigue, sore throat, myalgias, and some shortness of breath with exertion.  Patient states fever got as high as 102, currently patient is afebrile.  Patient states cough started off more productive but now is currently dry and sore throat has resolved.  Patient had a virtual visit on 7/8/20 and at that time had orders placed for strep throat and COVID-19 swabs, but did not have these done yet.  Patient states he came in today because of the persistent fevers.  Patient has has been taking Tylenol and ibuprofen for his symptoms.  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  Patient denies any history of asthma, diabetes, heart disease, autoimmune issues, and states that he smokes occasionally.  Patient denies drug use. Patient states overall his symptoms seem to be improving some, but the fevers on and off that are worse at night made him come in to be evaluated.     Exam findings above.  Chest x-ray obtained in office today which shows no acute airspace infiltrate.  Patient with normal vital signs other than elevated blood pressure.  Patient states that he does have hypertension and is usually on antihypertensive medications however he needs a refill and he stopped taking his because he would like to actually have a dose adjustment or change in his blood pressure medications since he did not like the side effects of the when he was currently on.  Patient was informed to follow-up with his primary care doctor make sure that he gets this taken care of since his blood  pressure is elevated.  Patient however denies any symptoms with hypertension urgency or emergency.  Discussed with patient that the symptoms do seem typical of COVID-19.  COVID-19 test sent and currently pending.  Patient to continue to remain in his home until test results are obtained.  Discussed with patient that if fevers become more persistent or worsening/change in symptoms occur including a productive cough he is to return to the emergency department for further evaluation work-up.  Patient is nontoxic and in no acute respiratory distress throughout visit today and is able to speak full sentences.  At this time no indication for further lab work-up or imaging.  Patient discharged in stable condition.    Discharge Medication List as of 7/11/2020  6:38 PM          Final diagnoses:   Suspected COVID-19 virus infection - COVID-19 test currently pending.   Cough   Fever   Hypertension - patient to follow up to get back on BP medications and get them adjusted.       7/11/2020   [facility] EMERGENCY DEPARTMENT     Kasey Jim PA-C  07/11/20 1652

## 2020-07-13 LAB
SARS-COV-2 RNA SPEC QL NAA+PROBE: NOT DETECTED
SPECIMEN SOURCE: NORMAL

## 2020-07-15 DIAGNOSIS — F41.9 ANXIETY: ICD-10-CM

## 2020-07-15 RX ORDER — ALPRAZOLAM 0.25 MG
TABLET ORAL
Qty: 10 TABLET | Refills: 0 | Status: SHIPPED | OUTPATIENT
Start: 2020-07-15 | End: 2021-01-05

## 2020-07-15 NOTE — TELEPHONE ENCOUNTER
Requested Prescriptions   Pending Prescriptions Disp Refills     ALPRAZolam (XANAX) 0.25 MG tablet [Pharmacy Med Name: ALPRAZOLAM 0.25MG TABS] 10 tablet 0     Sig: TAKE ONE TABLET BY MOUTH THREE TIMES A DAY AS NEEDED FOR ANXIETY       There is no refill protocol information for this order        Last Written Prescription Date:  06/18/20  Last Fill Quantity: 10,  # refills: 0   Last office visit: 12/4/2019 with prescribing provider:     Future Office Visit:    Relevant Medications     escitalopram (LEXAPRO) 20 MG tablet    buPROPion (WELLBUTRIN SR) 150 MG 12 hr tablet    ALPRAZolam (XANAX) 0.25 MG tablet    Last Encounter with Anxiety     Visit Information      Provider  Department  Center    7/15/2020  CHESTER Tristan CNP  [facility]  [facility]    Diagnoses      Codes  Comments    Anxiety  F41.9          [place]:     [place]  [email or web address]    PRESCRIPTIONS  Total Prescriptions: 11   Total Private Pay: 0   Fill Date ID Written Drug Qty Days Prescriber Rx # Pharmacy Refill Daily Dose * Pymt Type    06/18/2020  2   06/18/2020  Alprazolam 0.25 Mg Tablet  10.00 4 Ka Kirstie  [id number]  [place] ([id number])  0/0 1.25 [place]   05/14/2020  2   05/14/2020  Alprazolam 0.25 Mg Tablet  10.00 3 Ka Kirstie  [id number]  [place] ([id number][place]  0/[place] 1.[place]   04/21/2020  2   04/21/2020  Alprazolam 0.25 Mg Tablet  10.00 4 Ka Kirstie  [id number]  [place] ([id number])  0/[place] 1.[place]   03/24/2020  1   03/24/2020  Alprazolam 0.25 Mg Tablet  30.00 10 Ka Kirstie  [id number]  [place] ([id number][place]  0/[place] 1.[place]   02/07/2020  1   02/07/2020  Alprazolam 0.25 Mg Tablet  30.00 10 Ka Kirstie  [id number]  [place] ([id number])  0/[place] 1.[place]   12/23/2019  1   12/11/2019  Alprazolam 0.25 Mg Tablet  30.00 10 Ka Kirstie  [id number]  [place] ([id number])  1/1 1.[place]   12/11/2019  1   12/11/2019  Alprazolam 0.25 Mg Tablet  30.00 10 Ka Kirstie  [id number]  [place] ([id number])  0/1 1.[place]   08/19/2019  1   07/01/2019   Alprazolam 0.25 Mg Tablet  30.00 10 Ka Kirstie  [id number]  [place] ([id number])  5/5 1.[place]   08/10/2019  1   07/01/2019  Alprazolam 0.25 Mg Tablet  30.00 10 Ka Kirstie  [id number]  [place] ([id number])  4/5 1.[place]   08/01/2019  1   07/01/2019  Alprazolam 0.25 Mg Tablet  30.00 10 Ka Kirstie  [id number]  [place] ([id number])  3/5 1.[place]   07/23/2019  1   07/01/2019  Alprazolam 0.25 Mg Tablet  30.00 10 Ka Kirstie  [id number]  [place] ([id number])  2/5 1.[place]

## 2020-08-17 ENCOUNTER — TELEPHONE (OUTPATIENT)
Dept: FAMILY MEDICINE | Facility: CLINIC | Age: 35
End: 2020-08-17

## 2020-08-17 DIAGNOSIS — F41.9 ANXIETY: ICD-10-CM

## 2020-08-18 RX ORDER — ALPRAZOLAM 0.25 MG
TABLET ORAL
Qty: 10 TABLET | Refills: 0 | OUTPATIENT
Start: 2020-08-18

## 2020-08-18 NOTE — TELEPHONE ENCOUNTER
Patient needs an appointment for further refills this can be a virtual appointment last time patient was seen was greater than 6 months ago

## 2020-10-13 ENCOUNTER — TELEPHONE (OUTPATIENT)
Dept: FAMILY MEDICINE | Facility: CLINIC | Age: 35
End: 2020-10-13

## 2020-10-13 DIAGNOSIS — F41.9 ANXIETY: ICD-10-CM

## 2020-10-13 RX ORDER — ALPRAZOLAM 0.25 MG
TABLET ORAL
Qty: 10 TABLET | Refills: 0 | OUTPATIENT
Start: 2020-10-13

## 2020-11-16 ENCOUNTER — HEALTH MAINTENANCE LETTER (OUTPATIENT)
Age: 35
End: 2020-11-16

## 2020-12-09 ENCOUNTER — HOSPITAL ENCOUNTER (EMERGENCY)
Facility: CLINIC | Age: 35
Discharge: HOME | End: 2020-12-09
Attending: PHYSICIAN ASSISTANT | Admitting: PHYSICIAN ASSISTANT
Payer: COMMERCIAL

## 2020-12-09 ENCOUNTER — APPOINTMENT (OUTPATIENT)
Dept: GENERAL RADIOLOGY | Facility: CLINIC | Age: 35
End: 2020-12-09
Attending: PHYSICIAN ASSISTANT
Payer: COMMERCIAL

## 2020-12-09 VITALS
BODY MASS INDEX: 29.52 KG/M2 | DIASTOLIC BLOOD PRESSURE: 102 MMHG | HEIGHT: 74 IN | HEART RATE: 97 BPM | OXYGEN SATURATION: 98 % | TEMPERATURE: 99.3 F | SYSTOLIC BLOOD PRESSURE: 156 MMHG | WEIGHT: 230 LBS | RESPIRATION RATE: 18 BRPM

## 2020-12-09 DIAGNOSIS — M79.671 RIGHT FOOT PAIN: ICD-10-CM

## 2020-12-09 PROCEDURE — 73630 X-RAY EXAM OF FOOT: CPT | Mod: RT

## 2020-12-09 PROCEDURE — 99213 OFFICE O/P EST LOW 20 MIN: CPT | Performed by: PHYSICIAN ASSISTANT

## 2020-12-09 PROCEDURE — G0463 HOSPITAL OUTPT CLINIC VISIT: HCPCS | Performed by: PHYSICIAN ASSISTANT

## 2020-12-09 ASSESSMENT — MIFFLIN-ST. JEOR: SCORE: 2053.02

## 2020-12-09 NOTE — ED PROVIDER NOTES
History     Chief Complaint   Patient presents with     Foot Pain     HPI  Evangelist Macario is a 35 year old male who presents to the urgent care with concern over right foot pain and swelling.  Patient complains of pain primarily on the plantar surface of his foot just proximal to the second and third toe.  He does complain of associated swelling and redness and questionable intermittent paresthesias/change in sensation.  He states that pain will radiate to his heel and up his leg.  He attempted to treat by wearing tight work boot today without improvement.  He does not use ice/heat or tylenol/ibuprofen consistently.  He has had similar symptoms in both feet at different times for the last 18 months which will typically resolve spontaneously however current pain has been more persistent.   He denies any fever, chills myalgia, cough, dyspnea, wheezing, calf swelling.  He has not hd any prior evaluation for these symptoms.      Allergies:  No Known Allergies    Problem List:    Patient Active Problem List    Diagnosis Date Noted     Attention deficit hyperactivity disorder (ADHD), predominantly inattentive type 07/20/2018     Priority: Medium     Anxiety 05/10/2012     Priority: Medium     Moderate Depression [296.32] 01/30/2012     Priority: Medium     CARDIOVASCULAR SCREENING; LDL GOAL LESS THAN 160 10/31/2010     Priority: Medium     Tobacco use disorder 12/09/2008     Priority: Medium        Past Medical History:    No past medical history on file.    Past Surgical History:    Past Surgical History:   Procedure Laterality Date     SURGICAL HISTORY OF -   3/25/2004    Right Inguinal Herniorrhaphy with mesh     SURGICAL HISTORY OF -   7/22/1998    Open Appendectomy     SURGICAL HISTORY OF -   7/22/1998    Open Left inguinal Heerniorrhaphy.       Family History:    Family History   Problem Relation Age of Onset     Diabetes Brother      Cancer Father         pancreatic       Social History:  Marital Status:   "Single [1]  Social History     Tobacco Use     Smoking status: Former Smoker     Packs/day: 0.50     Years: 1.00     Pack years: 0.50     Types: Cigarettes     Quit date: 2013     Years since quittin.5     Smokeless tobacco: Never Used   Substance Use Topics     Alcohol use: Yes     Comment: once in while     Drug use: No        Medications:         ALPRAZolam (XANAX) 0.25 MG tablet       amphetamine-dextroamphetamine (ADDERALL XR) 20 MG per 24 hr capsule       buPROPion (WELLBUTRIN SR) 150 MG 12 hr tablet       escitalopram (LEXAPRO) 20 MG tablet       lisinopril (PRINIVIL/ZESTRIL) 20 MG tablet      Review of Systems  CONSTITUTIONAL:NEGATIVE for fever, chills, change in weight  INTEGUMENTARY/SKIN: POSITIVE for erythema of right foot NEGATIVE for ecchymosis, lacerations, abrasions   RESP:NEGATIVE for significant cough or SOB  MUSCULOSKELETAL: POSITIVE  for right foot pain and swelling and NEGATIVE for other concerning arthralgias or myalgias   NEURO:  POSITIVE for intermittent paresthesias, change in sensation  Physical Exam   BP: (!) 156/102  Pulse: 97  Temp: 99.3  F (37.4  C)  Resp: 18  Height: 188 cm (6' 2\")  Weight: 104.3 kg (230 lb)  SpO2: 98 %  Physical Exam  Constitutional:       General: He is not in acute distress.     Appearance: He is not ill-appearing or toxic-appearing.   HENT:      Head: Normocephalic and atraumatic.   Musculoskeletal:      Right ankle: Normal. He exhibits normal range of motion, no swelling, no ecchymosis, no deformity, no laceration and normal pulse. No tenderness.      Right lower leg: Normal.      Right foot: Normal range of motion and normal capillary refill. Tenderness and swelling present. No bony tenderness, crepitus, deformity or laceration.        Feet:    Skin:     General: Skin is warm and dry.      Findings: No abrasion, ecchymosis, erythema, laceration or rash.   Neurological:      Mental Status: He is alert.      Sensory: No sensory deficit.         ED Course "        Procedures               Critical Care time:  none               Results for orders placed or performed during the hospital encounter of 12/09/20   Foot  XR, G/E 3 views, right     Status: None    Narrative    EXAM: XR FOOT RT G/E 3 VW  LOCATION: [facility]  DATE/TIME: 12/9/2020 6:13 PM    INDICATION: Right foot pain.  COMPARISON: None.      Impression    IMPRESSION: Normal joint spaces and alignment. No fracture.     Medications - No data to display    Assessments & Plan (with Medical Decision Making)     I have reviewed the nursing notes.    I have reviewed the findings, diagnosis, plan and need for follow up with the patient.       New Prescriptions    No medications on file       Final diagnoses:   Right foot pain     35-year-old male presents to the urgent care with concern over several day history of intermittently recurrent right foot pain and swelling.  He had elevated blood pressure upon arrival, remainder of vital signs were stable.  Physical exam findings as described above were significant for a plantar wart on the plantar surface of his right foot however I do not believe this is contributing to his current pain.  He had tenderness palpation, swelling proximal to the second and third toes.  No significant heel, ankle or calf pain.  X-ray of the foot was negative for acute bony abnormality.  Differential for symptoms include plantar fasciitis, Delgado's neuroma, sprain/strain, occult stress fracture.  Do not suspect cellulitis, gout, DVT and will defer further evaluation.  He was discharged home stable with instructions for symptomatic treatment with rest, ice, tylenol/ibuprofen.  Given chronic intermittent nature of symptoms recommend follow up with podiatry for definitive evaluation.  Worrisome reason to return to ER/UC sooner discussed.     Disclaimer: This note consists of symbols derived from keyboarding, dictation, and/or voice recognition software. As a result, there may be  errors in the script that have gone undetected.  Please consider this when interpreting information found in the chart.      12/9/2020   [facility] EMERGENCY DEPT     Bev Lee PA-C  12/12/20 1959

## 2020-12-09 NOTE — ED AVS SNAPSHOT
[facility] Emergency Dept  [address] [address]  Phone: [phone]  Fax: [phone]                                    Evangelist Macario   MRN: [id number]    Department: [facility] Emergency Dept   Date of Visit: 12/9/2020           After Visit Summary Signature Page    I have received my discharge instructions, and my questions have been answered. I have discussed any challenges I see with this plan with the nurse or doctor.    ..........................................................................................................................................  Patient/Patient Representative Signature      ..........................................................................................................................................  Patient Representative Print Name and Relationship to Patient    ..................................................               ................................................  Date                                   Time    ..........................................................................................................................................  Reviewed by Signature/Title    ...................................................              ..............................................  Date                                               Time          22EPIC Rev 08/18

## 2020-12-09 NOTE — ED TRIAGE NOTES
Patient presents today with pain and foot swelling in the right foot. Pt reports no known injury. Pt also stated this happens every few months to both feet. Symptoms started a few days ago . Arrived to urgent care ambulatory.

## 2020-12-21 ENCOUNTER — TELEPHONE (OUTPATIENT)
Dept: FAMILY MEDICINE | Facility: CLINIC | Age: 35
End: 2020-12-21

## 2020-12-21 DIAGNOSIS — F33.1 MAJOR DEPRESSIVE DISORDER, RECURRENT EPISODE, MODERATE (H): ICD-10-CM

## 2020-12-21 DIAGNOSIS — F41.9 ANXIETY: ICD-10-CM

## 2020-12-22 RX ORDER — BUPROPION HYDROCHLORIDE 150 MG/1
TABLET, EXTENDED RELEASE ORAL
Qty: 14 TABLET | Refills: 0 | OUTPATIENT
Start: 2020-12-22

## 2020-12-22 NOTE — TELEPHONE ENCOUNTER
"Requested Prescriptions   Pending Prescriptions Disp Refills     buPROPion (WELLBUTRIN SR) 150 MG 12 hr tablet [Pharmacy Med Name: BUPROPION HCL ER (SR) 150MG TB12] 14 tablet 0     Sig: TAKE THREE TABLETS BY MOUTH EVERY NIGHT AT BEDTIME (NEED TO BE SEEN IN CLINIC FOR FURTHER REFILLS)       SSRIs Protocol Failed - 12/21/2020  6:03 AM        Failed - PHQ-9 score less than 5 in past 6 months     Please review last PHQ-9 score.           Failed - Recent (6 mo) or future (30 days) visit within the authorizing provider's specialty     Patient had office visit in the last 6 months or has a visit in the next 30 days with authorizing provider or within the authorizing provider's specialty.  See \"Patient Info\" tab in inbasket, or \"Choose Columns\" in Meds & Orders section of the refill encounter.            Passed - Medication is Bupropion     If the medication is Bupropion (Wellbutrin), and the patient is taking for smoking cessation; OK to refill.          Passed - Medication is active on med list        Passed - Patient is age 18 or older             "

## 2021-01-05 ENCOUNTER — VIRTUAL VISIT (OUTPATIENT)
Dept: FAMILY MEDICINE | Facility: CLINIC | Age: 36
End: 2021-01-05
Payer: COMMERCIAL

## 2021-01-05 DIAGNOSIS — F90.0 ATTENTION DEFICIT HYPERACTIVITY DISORDER (ADHD), PREDOMINANTLY INATTENTIVE TYPE: ICD-10-CM

## 2021-01-05 DIAGNOSIS — F33.1 MAJOR DEPRESSIVE DISORDER, RECURRENT EPISODE, MODERATE (H): ICD-10-CM

## 2021-01-05 DIAGNOSIS — F41.9 ANXIETY: ICD-10-CM

## 2021-01-05 PROCEDURE — 99214 OFFICE O/P EST MOD 30 MIN: CPT | Mod: 95 | Performed by: NURSE PRACTITIONER

## 2021-01-05 RX ORDER — ALPRAZOLAM 0.25 MG
TABLET ORAL
Qty: 10 TABLET | Refills: 0 | Status: SHIPPED | OUTPATIENT
Start: 2021-01-05 | End: 2022-11-03

## 2021-01-05 RX ORDER — BUPROPION HYDROCHLORIDE 150 MG/1
TABLET, EXTENDED RELEASE ORAL
Qty: 180 TABLET | Refills: 0 | Status: SHIPPED | OUTPATIENT
Start: 2021-01-05 | End: 2021-03-05

## 2021-01-05 RX ORDER — ESCITALOPRAM OXALATE 20 MG/1
20 TABLET ORAL DAILY
Qty: 90 TABLET | Refills: 3 | Status: SHIPPED | OUTPATIENT
Start: 2021-01-05 | End: 2021-12-07

## 2021-01-05 RX ORDER — DEXTROAMPHETAMINE SACCHARATE, AMPHETAMINE ASPARTATE MONOHYDRATE, DEXTROAMPHETAMINE SULFATE AND AMPHETAMINE SULFATE 5; 5; 5; 5 MG/1; MG/1; MG/1; MG/1
20 CAPSULE, EXTENDED RELEASE ORAL DAILY
Qty: 4 CAPSULE | Refills: 0 | Status: SHIPPED | OUTPATIENT
Start: 2021-01-05 | End: 2022-11-03

## 2021-01-05 ASSESSMENT — ANXIETY QUESTIONNAIRES
6. BECOMING EASILY ANNOYED OR IRRITABLE: NOT AT ALL
2. NOT BEING ABLE TO STOP OR CONTROL WORRYING: NOT AT ALL
3. WORRYING TOO MUCH ABOUT DIFFERENT THINGS: NOT AT ALL
1. FEELING NERVOUS, ANXIOUS, OR ON EDGE: SEVERAL DAYS
GAD7 TOTAL SCORE: 1
7. FEELING AFRAID AS IF SOMETHING AWFUL MIGHT HAPPEN: NOT AT ALL
5. BEING SO RESTLESS THAT IT IS HARD TO SIT STILL: NOT AT ALL

## 2021-01-05 ASSESSMENT — PATIENT HEALTH QUESTIONNAIRE - PHQ9
5. POOR APPETITE OR OVEREATING: NOT AT ALL
SUM OF ALL RESPONSES TO PHQ QUESTIONS 1-9: 7

## 2021-01-05 NOTE — PROGRESS NOTES
"Evangelist Macario is a 35 year old male who is being evaluated via a billable video visit.      How would you like to obtain your AVS? MyChart  If the video visit is dropped, the invitation should be resent by: Text to cell phone: [phone]  Will anyone else be joining your video visit? Yes: possibly his wife, . How would they like to receive their invitation? Text to cell phone: same      Video Start Time: 3:16 PM  Assessment & Plan     Anxiety   Controlled no change in treatment plan    - ALPRAZolam (XANAX) 0.25 MG tablet; TAKE ONE TABLET BY MOUTH THREE TIMES A DAY AS NEEDED FOR ANXIETY  - buPROPion (WELLBUTRIN SR) 150 MG 12 hr tablet; TAKE THREE TABLETS BY MOUTH EVERY NIGHT AT BEDTIME  - escitalopram (LEXAPRO) 20 MG tablet; Take 1 tablet (20 mg) by mouth daily    Attention deficit hyperactivity disorder (ADHD), predominantly inattentive type   Controlled no change in treatment plan   - amphetamine-dextroamphetamine (ADDERALL XR) 20 MG 24 hr capsule; Take 1 capsule (20 mg) by mouth daily On the day of your exam    Moderate Depression [296.32]   Controlled no change in treatment plan  - buPROPion (WELLBUTRIN SR) 150 MG 12 hr tablet; TAKE THREE TABLETS BY MOUTH EVERY NIGHT AT BEDTIME  - escitalopram (LEXAPRO) 20 MG tablet; Take 1 tablet (20 mg) by mouth daily  - **Basic metabolic panel FUTURE anytime; Future      Patient has been off of his lisinopril for the last 2 months has been monitoring his blood pressure blood pressures have been ranging in the 130s over 70s.  Patient will continue to monitor if his blood pressure goes above 140/80 patient will contact me and we will consider starting losartan versus the lisinopril which he felt had sleeping issues                     BMI:   Estimated body mass index is 29.53 kg/m  as calculated from the following:    Height as of 12/9/20: 1.88 m (6' 2\").    Weight as of 12/9/20: 104.3 kg (230 lb).   Weight management plan: Discussed healthy diet and exercise " guidelines      See Patient Instructions    No follow-ups on file.    Lauren Gilliam, APRN CNP  [facility]     Evangelist Macario is a 35 year old who presents to clinic today for the following health issues     HPI       Hypertension Follow-up      Do you check your blood pressure regularly outside of the clinic? No but will check it today    Off lisinopril for 2 months, patient discontinued due to sleep problems    Are you following a low salt diet? No    Are your blood pressures ever more than 140 on the top number (systolic) OR more   than 90 on the bottom number (diastolic), for example 140/90? N/A    Depression and Anxiety Follow-Up    How are you doing with your depression since your last visit? No change    How are you doing with your anxiety since your last visit?  No change    Are you having other symptoms that might be associated with depression or anxiety? No    Have you had a significant life event? No     Do you have any concerns with your use of alcohol or other drugs? No    Social History     Tobacco Use     Smoking status: Former Smoker     Packs/day: 0.50     Years: 1.00     Pack years: 0.50     Types: Cigarettes     Quit date: 2013     Years since quittin.6     Smokeless tobacco: Never Used   Substance Use Topics     Alcohol use: Yes     Comment: once in while     Drug use: No     PHQ 3/18/2019 2019 2021   PHQ-9 Total Score 3 3 7   Q9: Thoughts of better off dead/self-harm past 2 weeks Not at all Not at all Not at all     ITA-7 SCORE 3/18/2019 2019 2021   Total Score - - -   Total Score 5 (mild anxiety) - -   Total Score 5 4 1     Last PHQ-9 2021   1.  Little interest or pleasure in doing things 0   2.  Feeling down, depressed, or hopeless 0   3.  Trouble falling or staying asleep, or sleeping too much 3   4.  Feeling tired or having little energy 3   5.  Poor appetite or overeating 0   6.  Feeling bad about yourself 0   7.   Trouble concentrating 1   8.  Moving slowly or restless 0   Q9: Thoughts of better off dead/self-harm past 2 weeks 0   PHQ-9 Total Score 7   Difficulty at work, home, or with people -     ITA-7  1/5/2021   1. Feeling nervous, anxious, or on edge 1   2. Not being able to stop or control worrying 0   3. Worrying too much about different things 0   4. Trouble relaxing 0   5. Being so restless that it is hard to sit still 0   6. Becoming easily annoyed or irritable 0   7. Feeling afraid, as if something awful might happen 0   ITA-7 Total Score 1   If you checked any problems, how difficult have they made it for you to do your work, take care of things at home, or get along with other people? -       Suicide Assessment Five-step Evaluation and Treatment (SAFE-T)      How many servings of fruits and vegetables do you eat daily?  2-3    On average, how many sweetened beverages do you drink each day (Examples: soda, juice, sweet tea, etc.  Do NOT count diet or artificially sweetened beverages)?   3    How many days per week do you exercise enough to make your heart beat faster? 3 or less    How many minutes a day do you exercise enough to make your heart beat faster? 9 or less  How many days per week do you miss taking your medication? Sometimes doesn't miss medication and sometimes goes for stretch when misses a lot.   Off lisinopril for 2 months, patient discontinued due to sleep problems    What makes it hard for you to take your medications?  not sure    Medication Followup of Adderall    Taking Medication as prescribed: yes    Side Effects:  None    Medication Helping Symptoms:  yes         Review of Systems   Constitutional, HEENT, cardiovascular, pulmonary, gi and gu systems are negative, except as otherwise noted.      Objective           Vitals:  No vitals were obtained today due to virtual visit.    Physical Exam   GENERAL: Healthy, alert and no distress  EYES: Eyes grossly normal to inspection.  No discharge or  erythema, or obvious scleral/conjunctival abnormalities.  RESP: No audible wheeze, cough, or visible cyanosis.  No visible retractions or increased work of breathing.    SKIN: Visible skin clear. No significant rash, abnormal pigmentation or lesions.  NEURO: Cranial nerves grossly intact.  Mentation and speech appropriate for age.  PSYCH: Mentation appears normal, affect normal/bright, judgement and insight intact, normal speech and appearance well-groomed.    No results found for this or any previous visit (from the past 24 hour(s)).            Video-Visit Details    Type of service:  Video Visit    Video End Time:3:29     Originating Location (pt. Location): Home    Distant Location (provider location):  [facility]     Platform used for Video Visit: [id number] Pie[employer]

## 2021-01-06 ASSESSMENT — ANXIETY QUESTIONNAIRES: GAD7 TOTAL SCORE: 1

## 2021-03-03 DIAGNOSIS — F33.1 MAJOR DEPRESSIVE DISORDER, RECURRENT EPISODE, MODERATE (H): ICD-10-CM

## 2021-03-03 DIAGNOSIS — F41.9 ANXIETY: ICD-10-CM

## 2021-03-05 RX ORDER — BUPROPION HYDROCHLORIDE 150 MG/1
TABLET, EXTENDED RELEASE ORAL
Qty: 180 TABLET | Refills: 0 | Status: SHIPPED | OUTPATIENT
Start: 2021-03-05 | End: 2021-06-10

## 2021-03-09 ENCOUNTER — ALLIED HEALTH/NURSE VISIT (OUTPATIENT)
Dept: FAMILY MEDICINE | Facility: CLINIC | Age: 36
End: 2021-03-09
Payer: COMMERCIAL

## 2021-03-09 VITALS — SYSTOLIC BLOOD PRESSURE: 139 MMHG | DIASTOLIC BLOOD PRESSURE: 79 MMHG

## 2021-03-09 DIAGNOSIS — I10 BENIGN ESSENTIAL HYPERTENSION: Primary | ICD-10-CM

## 2021-03-09 PROCEDURE — 99207 PR NO CHARGE NURSE ONLY: CPT | Performed by: PHYSICIAN ASSISTANT

## 2021-06-10 DIAGNOSIS — F33.1 MAJOR DEPRESSIVE DISORDER, RECURRENT EPISODE, MODERATE (H): ICD-10-CM

## 2021-06-10 DIAGNOSIS — F41.9 ANXIETY: ICD-10-CM

## 2021-06-10 RX ORDER — BUPROPION HYDROCHLORIDE 150 MG/1
TABLET, EXTENDED RELEASE ORAL
Qty: 180 TABLET | Refills: 0 | Status: SHIPPED | OUTPATIENT
Start: 2021-06-10 | End: 2021-10-14

## 2021-09-12 ENCOUNTER — HEALTH MAINTENANCE LETTER (OUTPATIENT)
Age: 36
End: 2021-09-12

## 2021-10-13 DIAGNOSIS — F41.9 ANXIETY: ICD-10-CM

## 2021-10-13 DIAGNOSIS — F33.1 MAJOR DEPRESSIVE DISORDER, RECURRENT EPISODE, MODERATE (H): ICD-10-CM

## 2021-10-14 RX ORDER — BUPROPION HYDROCHLORIDE 150 MG/1
TABLET, EXTENDED RELEASE ORAL
Qty: 180 TABLET | Refills: 0 | Status: SHIPPED | OUTPATIENT
Start: 2021-10-14 | End: 2022-03-22

## 2021-12-05 DIAGNOSIS — F33.1 MAJOR DEPRESSIVE DISORDER, RECURRENT EPISODE, MODERATE (H): ICD-10-CM

## 2021-12-05 DIAGNOSIS — F41.9 ANXIETY: ICD-10-CM

## 2021-12-07 RX ORDER — ESCITALOPRAM OXALATE 20 MG/1
TABLET ORAL
Qty: 90 TABLET | Refills: 0 | Status: SHIPPED | OUTPATIENT
Start: 2021-12-07 | End: 2022-04-22

## 2022-01-02 ENCOUNTER — HEALTH MAINTENANCE LETTER (OUTPATIENT)
Age: 37
End: 2022-01-02

## 2022-03-16 DIAGNOSIS — F33.1 MAJOR DEPRESSIVE DISORDER, RECURRENT EPISODE, MODERATE (H): ICD-10-CM

## 2022-03-16 DIAGNOSIS — F41.9 ANXIETY: ICD-10-CM

## 2022-03-17 NOTE — TELEPHONE ENCOUNTER
Need PHQ/ITA updated, also patient needs in person appointment, this is my charted to patient, awaiting response.    ALEXANDRA Quintanilla

## 2022-03-18 NOTE — TELEPHONE ENCOUNTER
Pt was called and message left for pt to call and make an appt for yearly exam. Klaudia Awad RN

## 2022-03-22 RX ORDER — BUPROPION HYDROCHLORIDE 150 MG/1
TABLET, EXTENDED RELEASE ORAL
Qty: 180 TABLET | Refills: 0 | Status: SHIPPED | OUTPATIENT
Start: 2022-03-22 | End: 2022-11-03

## 2022-04-15 DIAGNOSIS — F33.1 MAJOR DEPRESSIVE DISORDER, RECURRENT EPISODE, MODERATE (H): ICD-10-CM

## 2022-04-15 DIAGNOSIS — F41.9 ANXIETY: ICD-10-CM

## 2022-04-18 NOTE — TELEPHONE ENCOUNTER
Left a message for Evangelist to return our call. He is due for a follow-up for this request.    Seema Olivares RN

## 2022-04-20 NOTE — TELEPHONE ENCOUNTER
Called Pt left message stating we were calling due to a request we had at the clinic and asked for a call back also left clinic contact info.

## 2022-04-22 RX ORDER — ESCITALOPRAM OXALATE 20 MG/1
TABLET ORAL
Qty: 90 TABLET | Refills: 0 | Status: SHIPPED | OUTPATIENT
Start: 2022-04-22 | End: 2022-10-21

## 2022-11-01 DIAGNOSIS — F41.9 ANXIETY: ICD-10-CM

## 2022-11-01 DIAGNOSIS — F33.1 MAJOR DEPRESSIVE DISORDER, RECURRENT EPISODE, MODERATE (H): ICD-10-CM

## 2022-11-01 RX ORDER — ESCITALOPRAM OXALATE 20 MG/1
TABLET ORAL
Qty: 14 TABLET | Refills: 0 | OUTPATIENT
Start: 2022-11-01

## 2022-11-03 ENCOUNTER — OFFICE VISIT (OUTPATIENT)
Dept: FAMILY MEDICINE | Facility: CLINIC | Age: 37
End: 2022-11-03
Payer: COMMERCIAL

## 2022-11-03 ENCOUNTER — TELEPHONE (OUTPATIENT)
Dept: FAMILY MEDICINE | Facility: CLINIC | Age: 37
End: 2022-11-03

## 2022-11-03 VITALS
SYSTOLIC BLOOD PRESSURE: 126 MMHG | BODY MASS INDEX: 27.85 KG/M2 | DIASTOLIC BLOOD PRESSURE: 80 MMHG | HEART RATE: 92 BPM | HEIGHT: 74 IN | WEIGHT: 217 LBS | RESPIRATION RATE: 18 BRPM | TEMPERATURE: 98 F

## 2022-11-03 DIAGNOSIS — F41.9 ANXIETY: ICD-10-CM

## 2022-11-03 DIAGNOSIS — F33.1 MAJOR DEPRESSIVE DISORDER, RECURRENT EPISODE, MODERATE (H): ICD-10-CM

## 2022-11-03 DIAGNOSIS — F90.0 ATTENTION DEFICIT HYPERACTIVITY DISORDER (ADHD), PREDOMINANTLY INATTENTIVE TYPE: ICD-10-CM

## 2022-11-03 PROCEDURE — 99214 OFFICE O/P EST MOD 30 MIN: CPT | Performed by: NURSE PRACTITIONER

## 2022-11-03 RX ORDER — ALPRAZOLAM 0.25 MG
TABLET ORAL
Qty: 15 TABLET | Refills: 0 | Status: SHIPPED | OUTPATIENT
Start: 2022-11-03 | End: 2023-04-17

## 2022-11-03 RX ORDER — BUSPIRONE HYDROCHLORIDE 5 MG/1
TABLET ORAL
Qty: 106 TABLET | Refills: 0 | Status: SHIPPED | OUTPATIENT
Start: 2022-11-03 | End: 2022-12-07

## 2022-11-03 RX ORDER — DEXTROAMPHETAMINE SACCHARATE, AMPHETAMINE ASPARTATE MONOHYDRATE, DEXTROAMPHETAMINE SULFATE AND AMPHETAMINE SULFATE 5; 5; 5; 5 MG/1; MG/1; MG/1; MG/1
20 CAPSULE, EXTENDED RELEASE ORAL DAILY
Qty: 14 CAPSULE | Refills: 0 | Status: SHIPPED | OUTPATIENT
Start: 2022-11-03 | End: 2024-01-15

## 2022-11-03 RX ORDER — ESCITALOPRAM OXALATE 20 MG/1
20 TABLET ORAL DAILY
Qty: 90 TABLET | Refills: 0 | Status: SHIPPED | OUTPATIENT
Start: 2022-11-03 | End: 2023-03-06

## 2022-11-03 ASSESSMENT — ANXIETY QUESTIONNAIRES
4. TROUBLE RELAXING: SEVERAL DAYS
GAD7 TOTAL SCORE: 12
5. BEING SO RESTLESS THAT IT IS HARD TO SIT STILL: MORE THAN HALF THE DAYS
GAD7 TOTAL SCORE: 12
1. FEELING NERVOUS, ANXIOUS, OR ON EDGE: MORE THAN HALF THE DAYS
2. NOT BEING ABLE TO STOP OR CONTROL WORRYING: MORE THAN HALF THE DAYS
8. IF YOU CHECKED OFF ANY PROBLEMS, HOW DIFFICULT HAVE THESE MADE IT FOR YOU TO DO YOUR WORK, TAKE CARE OF THINGS AT HOME, OR GET ALONG WITH OTHER PEOPLE?: SOMEWHAT DIFFICULT
7. FEELING AFRAID AS IF SOMETHING AWFUL MIGHT HAPPEN: MORE THAN HALF THE DAYS
IF YOU CHECKED OFF ANY PROBLEMS ON THIS QUESTIONNAIRE, HOW DIFFICULT HAVE THESE PROBLEMS MADE IT FOR YOU TO DO YOUR WORK, TAKE CARE OF THINGS AT HOME, OR GET ALONG WITH OTHER PEOPLE: SOMEWHAT DIFFICULT
3. WORRYING TOO MUCH ABOUT DIFFERENT THINGS: SEVERAL DAYS
7. FEELING AFRAID AS IF SOMETHING AWFUL MIGHT HAPPEN: MORE THAN HALF THE DAYS
GAD7 TOTAL SCORE: 12
6. BECOMING EASILY ANNOYED OR IRRITABLE: MORE THAN HALF THE DAYS

## 2022-11-03 ASSESSMENT — PATIENT HEALTH QUESTIONNAIRE - PHQ9
10. IF YOU CHECKED OFF ANY PROBLEMS, HOW DIFFICULT HAVE THESE PROBLEMS MADE IT FOR YOU TO DO YOUR WORK, TAKE CARE OF THINGS AT HOME, OR GET ALONG WITH OTHER PEOPLE: VERY DIFFICULT
SUM OF ALL RESPONSES TO PHQ QUESTIONS 1-9: 8
SUM OF ALL RESPONSES TO PHQ QUESTIONS 1-9: 8

## 2022-11-03 ASSESSMENT — PAIN SCALES - GENERAL: PAINLEVEL: NO PAIN (0)

## 2022-11-03 NOTE — PROGRESS NOTES
"  Assessment & Plan     (F41.9) Anxiety  Comment: Uncontrolled will start BuSpar and recommend therapy patient in agreem uncontrolled recommend ent  Plan: REVIEW OF HEALTH MAINTENANCE PROTOCOL ORDERS,         Adult Mental Health  Referral,         escitalopram (LEXAPRO) 20 MG tablet, busPIRone         (BUSPAR) 5 MG tablet, ALPRAZolam (XANAX) 0.25         MG tablet      (F33.1) Moderate Depression [296.32]  Comment: Therapy referral placed  Plan: REVIEW OF HEALTH MAINTENANCE PROTOCOL ORDERS,         [facility],         escitalopram (LEXAPRO) 20 MG tablet      (F90.0) Attention deficit hyperactivity disorder (ADHD), predominantly inattentive type  Comment:  Controlled no change in treatment plan  Plan: amphetamine-dextroamphetamine (ADDERALL XR) 20         MG 24 hr capsule         BMI:   Estimated body mass index is 27.85 kg/m  as calculated from the following:    Height as of this encounter: 1.88 m (6' 2.02\").    Weight as of this encounter: 98.4 kg (217 lb).   Weight management plan: Discussed healthy diet and exercise guidelines    See Patient Instructions    No follow-ups on file.    [facility]    Esperanza Blanca is a 37 year old, presenting for the following health issues:  No chief complaint on file.      History of Present Illness       Mental Health Follow-up:  Patient presents to follow-up on Depression & Anxiety.Patient's depression since last visit has been:  Medium  The patient is having other symptoms associated with depression.  Patient's anxiety since last visit has been:  Medium  The patient is having other symptoms associated with anxiety.  Any significant life events: housing concerns  Patient is feeling anxious or having panic attacks.  Patient has no concerns about alcohol or drug use.    He eats 0-1 servings of fruits and vegetables daily.He consumes 3 sweetened beverage(s) daily.He exercises with enough " "effort to increase his heart rate 9 or less minutes per day.  He exercises with enough effort to increase his heart rate 3 or less days per week. He is missing 1 dose(s) of medications per week.    Today's PHQ-9         PHQ-9 Total Score: 8    PHQ-9 Q9 Thoughts of better off dead/self-harm past 2 weeks :   Not at all    How difficult have these problems made it for you to do your work, take care of things at home, or get along with other people: Very difficult  Today's ITA-7 Score: 12     Review of Systems   Constitutional, HEENT, cardiovascular, pulmonary, gi and gu systems are negative, except as otherwise noted.      Objective    /80 (BP Location: Right arm, Cuff Size: Adult Large)   Pulse 92   Temp 98  F (36.7  C) (Tympanic)   Resp 18   Ht 1.88 m (6' 2.02\")   Wt 98.4 kg (217 lb)   BMI 27.85 kg/m    There is no height or weight on file to calculate BMI.  Physical Exam   GENERAL: healthy, alert and no distress  EYES: Eyes grossly normal to inspection, PERRL and conjunctivae and sclerae normal  HENT: ear canals and TM's normal, nose and mouth without ulcers or lesions  NECK: no adenopathy, no asymmetry, masses, or scars and thyroid normal to palpation  RESP: lungs clear to auscultation - no rales, rhonchi or wheezes  CV: regular rate and rhythm, normal S1 S2, no S3 or S4, no murmur, click or rub, no peripheral edema and peripheral pulses strong  ABDOMEN: soft, nontender, no hepatosplenomegaly, no masses and bowel sounds normal  MS: no gross musculoskeletal defects noted, no edema  SKIN: no suspicious lesions or rashes  NEURO: Normal strength and tone, mentation intact and speech normal  PSYCH: mentation appears normal, affect normal/bright                  "

## 2022-11-04 NOTE — TELEPHONE ENCOUNTER
Central Prior Authorization Team  Phone: [phone]    PA Initiation    Medication: Amphetamine-Dextroamphetamine ER 20 CP24  Insurance Company: [employer] - Phone [phone] Fax [phone]  Pharmacy Filling the Rx: [place], [place] - [address] [place] [address]  Filling Pharmacy Phone: [phone]  Filling Pharmacy Fax:    Start Date: 11/4/2022

## 2022-11-07 NOTE — TELEPHONE ENCOUNTER
Central Prior Authorization Team  Phone: [phone]    Prior Authorization Approval    Authorization Effective Date:    Authorization Expiration Date:  ONGOING  Medication: Amphetamine-Dextroamphetamine ER 20 CP24-APPROVED   Approved Dose/Quantity:   Reference #:     Insurance Company: [employer] - Phone [phone] Fax [phone]  Expected CoPay:       CoPay Card Available:      Foundation Assistance Needed:    Which Pharmacy is filling the prescription (Not needed for infusion/clinic administered): [facility] [place], [place] - [address] [place] [address]  Pharmacy Notified: Yes  Patient Notified: Yes

## 2022-11-19 ENCOUNTER — HEALTH MAINTENANCE LETTER (OUTPATIENT)
Age: 37
End: 2022-11-19

## 2022-11-23 ENCOUNTER — TELEPHONE (OUTPATIENT)
Dept: FAMILY MEDICINE | Facility: CLINIC | Age: 37
End: 2022-11-23

## 2022-11-23 NOTE — TELEPHONE ENCOUNTER
Attempted to reach patient at phone number listed below. No answer, left message on unidentified voicemail to return call.  Radha CUELLAR RN

## 2022-11-23 NOTE — TELEPHONE ENCOUNTER
Medication Question or Refill    Contacts       Type Contact Phone/Fax    11/23/2022 09:29 AM CST Phone (Incoming) luis daniel bose (Emergency Contact) [phone]          What medication are you calling about (include dose and sig)?:Buspirone, Alprazolam, Adderall, Escitalopram    Controlled Substance Agreement on file:   CSA -- Patient Level:    CSA: None found at the patient level.       Who prescribed the medication?: Lauren Gilliam    Do you need a refill? No    When did you use the medication last? Daily or as needed    Patient offered an appointment? No    Do you have any questions or concerns?  Yes: Patient was prescribed 4 medications in 11/3/22, he was doing okay, but now is struggling again with anxiety and depression. Wife wondering what to do next, she does not ha      Preferred Pharmacy:       [facility] [place] [id number]  Phone: [phone] Fax: [phone]      Could we send this information to you in [employer] or would you prefer to receive a phone call?:   Patient would prefer a phone call   Okay to leave a detailed message?: Yes at Cell number on file:    Telephone Information:   Mobile [phone]

## 2022-12-06 DIAGNOSIS — F41.9 ANXIETY: ICD-10-CM

## 2022-12-07 RX ORDER — BUSPIRONE HYDROCHLORIDE 5 MG/1
10 TABLET ORAL 2 TIMES DAILY
Qty: 120 TABLET | Refills: 2 | Status: SHIPPED | OUTPATIENT
Start: 2022-12-07 | End: 2023-04-17

## 2022-12-27 DIAGNOSIS — F41.9 ANXIETY: ICD-10-CM

## 2022-12-28 RX ORDER — ALPRAZOLAM 0.25 MG
TABLET ORAL
Qty: 15 TABLET | Refills: 0 | OUTPATIENT
Start: 2022-12-28

## 2022-12-28 NOTE — TELEPHONE ENCOUNTER
Needs appointment was just a temporary prescription untill antianxiety medication started working if needs a refill should reevaluate medications

## 2022-12-29 NOTE — TELEPHONE ENCOUNTER
Evangelist is taking BUSPAR. So far so good with this med.    Cynthia Erickson on 12/29/2022 at 5:24 PM

## 2023-03-03 DIAGNOSIS — F41.9 ANXIETY: ICD-10-CM

## 2023-03-03 DIAGNOSIS — F33.1 MAJOR DEPRESSIVE DISORDER, RECURRENT EPISODE, MODERATE (H): ICD-10-CM

## 2023-03-06 RX ORDER — ESCITALOPRAM OXALATE 20 MG/1
20 TABLET ORAL DAILY
Qty: 90 TABLET | Refills: 0 | Status: SHIPPED | OUTPATIENT
Start: 2023-03-06 | End: 2023-06-29

## 2023-04-09 ENCOUNTER — HEALTH MAINTENANCE LETTER (OUTPATIENT)
Age: 38
End: 2023-04-09

## 2023-04-16 DIAGNOSIS — F41.9 ANXIETY: ICD-10-CM

## 2023-04-17 RX ORDER — BUSPIRONE HYDROCHLORIDE 5 MG/1
10 TABLET ORAL 2 TIMES DAILY
Qty: 120 TABLET | Refills: 2 | Status: SHIPPED | OUTPATIENT
Start: 2023-04-17 | End: 2024-01-15

## 2023-04-17 RX ORDER — ALPRAZOLAM 0.25 MG
TABLET ORAL
Qty: 15 TABLET | Refills: 0 | Status: SHIPPED | OUTPATIENT
Start: 2023-04-17 | End: 2024-01-15

## 2023-05-08 ENCOUNTER — HOSPITAL ENCOUNTER (EMERGENCY)
Facility: CLINIC | Age: 38
Discharge: HOME | End: 2023-05-08
Attending: NURSE PRACTITIONER | Admitting: NURSE PRACTITIONER
Payer: COMMERCIAL

## 2023-05-08 VITALS
TEMPERATURE: 98.7 F | DIASTOLIC BLOOD PRESSURE: 85 MMHG | RESPIRATION RATE: 20 BRPM | HEART RATE: 82 BPM | OXYGEN SATURATION: 98 % | SYSTOLIC BLOOD PRESSURE: 131 MMHG

## 2023-05-08 DIAGNOSIS — M10.9 ACUTE GOUTY ARTHRITIS: ICD-10-CM

## 2023-05-08 PROCEDURE — 99213 OFFICE O/P EST LOW 20 MIN: CPT | Performed by: NURSE PRACTITIONER

## 2023-05-08 PROCEDURE — G0463 HOSPITAL OUTPT CLINIC VISIT: HCPCS | Performed by: NURSE PRACTITIONER

## 2023-05-08 RX ORDER — PREDNISONE 20 MG/1
TABLET ORAL
Qty: 10 TABLET | Refills: 0 | Status: SHIPPED | OUTPATIENT
Start: 2023-05-08 | End: 2024-01-15

## 2023-05-08 RX ORDER — OXYCODONE HYDROCHLORIDE 5 MG/1
5 TABLET ORAL EVERY 6 HOURS PRN
Qty: 3 TABLET | Refills: 0 | Status: SHIPPED | OUTPATIENT
Start: 2023-05-08 | End: 2024-01-15

## 2023-05-08 ASSESSMENT — ENCOUNTER SYMPTOMS
JOINT SWELLING: 1
ARTHRALGIAS: 1
COLOR CHANGE: 1

## 2023-05-08 NOTE — ED PROVIDER NOTES
History     Chief Complaint   Patient presents with     Foot Pain     Left foot pain. Started Saturday. Cannot put pressure on foot. Swollen and red. Pain 8/10.     HPI  Evangelist Macario is a 37 year old male who presents to the urgent care for evaluation of left foot pain.  2 days ago patient noticed left great toe pain which is now progressed to the distal dorsal aspect of the left foot.  Previous history of gout.  Area has been swollen, red and tender to any touch.  Has not been using any over-the-counter medications.  Unknown trigger for flare.  He reports being afebrile and otherwise well.  No known injury or trauma.    Allergies:  No Known Allergies    Problem List:    Patient Active Problem List    Diagnosis Date Noted     Attention deficit hyperactivity disorder (ADHD), predominantly inattentive type 2018     Priority: Medium     Anxiety 05/10/2012     Priority: Medium     Moderate Depression [296.32] 2012     Priority: Medium     CARDIOVASCULAR SCREENING; LDL GOAL LESS THAN 160 10/31/2010     Priority: Medium     Tobacco use disorder 2008     Priority: Medium        Past Medical History:    No past medical history on file.    Past Surgical History:    Past Surgical History:   Procedure Laterality Date     SURGICAL HISTORY OF -   3/25/2004    Right Inguinal Herniorrhaphy with mesh     SURGICAL HISTORY OF -   1998    Open Appendectomy     SURGICAL HISTORY OF -   1998    Open Left inguinal Heerniorrhaphy.       Family History:    Family History   Problem Relation Age of Onset     Diabetes Brother      Cancer Father         pancreatic       Social History:  Marital Status:  Single [1]  Social History     Tobacco Use     Smoking status: Former     Packs/day: 0.50     Years: 1.00     Pack years: 0.50     Types: Cigarettes     Quit date: 2013     Years since quittin.9     Smokeless tobacco: Never   Vaping Use     Vaping status: Never Used   Substance Use Topics     Alcohol  use: Yes     Comment: once in while     Drug use: No        Medications:    ALPRAZolam (XANAX) 0.25 MG tablet  busPIRone (BUSPAR) 5 MG tablet  escitalopram (LEXAPRO) 20 MG tablet  oxyCODONE (ROXICODONE) 5 MG tablet  predniSONE (DELTASONE) 20 MG tablet  amphetamine-dextroamphetamine (ADDERALL XR) 20 MG 24 hr capsule          Review of Systems   Musculoskeletal: Positive for arthralgias and joint swelling.   Skin: Positive for color change.   All other systems reviewed and are negative.      Physical Exam   BP: 131/85  Pulse: 82  Temp: 98.7  F (37.1  C)  Resp: 20  SpO2: 98 %      Physical Exam  Constitutional:       General: He is not in acute distress.     Appearance: Normal appearance.   Eyes:      Conjunctiva/sclera: Conjunctivae normal.      Pupils: Pupils are equal, round, and reactive to light.   Cardiovascular:      Rate and Rhythm: Normal rate.   Pulmonary:      Effort: Pulmonary effort is normal.   Musculoskeletal:         General: Normal range of motion.      Cervical back: Normal range of motion.        Feet:    Feet:      Comments: Area of erythema and tenderness to palpation, scant edema.  Pulse and perfusion equal bilaterally.  Ambulatory but with pain present.  Skin:     General: Skin is warm.      Capillary Refill: Capillary refill takes less than 2 seconds.   Neurological:      General: No focal deficit present.      Mental Status: He is alert.         ED Course                 Procedures         No results found for this or any previous visit (from the past 24 hour(s)).    Medications - No data to display    Assessments & Plan (with Medical Decision Making)   Evangelist Macario is a 37 year old male who presents to the urgent care for evaluation of left foot pain.  2 days ago patient noticed left great toe pain which is now progressed to the distal dorsal aspect of the left foot. Vital signs normal, physical exam as above and consistent with gout flare. Prescription for prednisone sent.   Over-the-counter medications as needed and appropriate, oxycodone for increased pain.  Discussed safe use of narcotics. Return precautions reviewed, all questions answered. Patient is agreeable to plan of care and discharged in no acute distress.     I have reviewed the nursing notes.    I have reviewed the findings, diagnosis, plan and need for follow up with the patient.    Discharge Medication List as of 5/8/2023  2:22 PM      START taking these medications    Details   oxyCODONE (ROXICODONE) 5 MG tablet Take 1 tablet (5 mg) by mouth every 6 hours as needed for severe pain, Disp-3 tablet, R-0, E-Prescribe      predniSONE (DELTASONE) 20 MG tablet Take two tablets (= 40mg) each day for 5 (five) days, Disp-10 tablet, R-0, E-Prescribe             Final diagnoses:   Acute gouty arthritis       5/8/2023   [facility] EMERGENCY DEPT     Suzanne Villa, APRN CNP  05/08/23 2190

## 2023-05-14 ENCOUNTER — HOSPITAL ENCOUNTER (EMERGENCY)
Facility: CLINIC | Age: 38
Discharge: HOME | End: 2023-05-14
Attending: PHYSICIAN ASSISTANT | Admitting: PHYSICIAN ASSISTANT
Payer: COMMERCIAL

## 2023-05-14 VITALS
SYSTOLIC BLOOD PRESSURE: 154 MMHG | OXYGEN SATURATION: 99 % | TEMPERATURE: 98.6 F | RESPIRATION RATE: 20 BRPM | DIASTOLIC BLOOD PRESSURE: 101 MMHG | HEART RATE: 79 BPM

## 2023-05-14 DIAGNOSIS — M10.9 ACUTE GOUTY ARTHRITIS: ICD-10-CM

## 2023-05-14 PROCEDURE — G0463 HOSPITAL OUTPT CLINIC VISIT: HCPCS | Performed by: PHYSICIAN ASSISTANT

## 2023-05-14 PROCEDURE — 99213 OFFICE O/P EST LOW 20 MIN: CPT | Performed by: PHYSICIAN ASSISTANT

## 2023-05-14 RX ORDER — PREDNISONE 10 MG/1
TABLET ORAL
Qty: 32 TABLET | Refills: 0 | Status: SHIPPED | OUTPATIENT
Start: 2023-05-14 | End: 2023-05-24

## 2023-05-14 RX ORDER — OXYCODONE HYDROCHLORIDE 5 MG/1
5 TABLET ORAL EVERY 6 HOURS PRN
Qty: 5 TABLET | Refills: 0 | Status: SHIPPED | OUTPATIENT
Start: 2023-05-14 | End: 2023-05-17

## 2023-05-14 ASSESSMENT — ENCOUNTER SYMPTOMS: CONSTITUTIONAL NEGATIVE: 1

## 2023-05-14 ASSESSMENT — ACTIVITIES OF DAILY LIVING (ADL): ADLS_ACUITY_SCORE: 35

## 2023-05-14 NOTE — ED PROVIDER NOTES
History     Chief Complaint   Patient presents with     Foot Pain     Pt is having left foot pain. Pt was being treated for gout. Was on prednisone but has not gotten better.      HPI  Evangelist Macario is a 37 year old male who presents with complaints of recurrence of left foot pain, swelling, and redness.  Patient states he was evaluated in the urgent care on 5/8/2023 for left foot pain.  He was diagnosed with suspected gout as he has a history of this in the past with similar symptoms.  He was prescribed 5 days of prednisone which he completed with initial improvement in his symptoms however upon completing the course of steroids, he had a recurrence of the symptoms.  Patient states it felt like he did not have enough or longer course of prednisone as it seemed to initially be helping with his symptoms.  Patient denies any preceding injury or trauma to the foot.  Denies fevers or chills.      Allergies:  No Known Allergies    Problem List:    Patient Active Problem List    Diagnosis Date Noted     Attention deficit hyperactivity disorder (ADHD), predominantly inattentive type 07/20/2018     Priority: Medium     Anxiety 05/10/2012     Priority: Medium     Moderate Depression [296.32] 01/30/2012     Priority: Medium     CARDIOVASCULAR SCREENING; LDL GOAL LESS THAN 160 10/31/2010     Priority: Medium     Tobacco use disorder 12/09/2008     Priority: Medium        Past Medical History:    No past medical history on file.    Past Surgical History:    Past Surgical History:   Procedure Laterality Date     SURGICAL HISTORY OF -   3/25/2004    Right Inguinal Herniorrhaphy with mesh     SURGICAL HISTORY OF -   7/22/1998    Open Appendectomy     SURGICAL HISTORY OF -   7/22/1998    Open Left inguinal Heerniorrhaphy.       Family History:    Family History   Problem Relation Age of Onset     Diabetes Brother      Cancer Father         pancreatic       Social History:  Marital Status:  Single [1]  Social History      Tobacco Use     Smoking status: Former     Packs/day: 0.50     Years: 1.00     Pack years: 0.50     Types: Cigarettes     Quit date: 2013     Years since quittin.9     Smokeless tobacco: Never   Vaping Use     Vaping status: Never Used   Substance Use Topics     Alcohol use: Yes     Comment: once in while     Drug use: No        Medications:    oxyCODONE (ROXICODONE) 5 MG tablet  predniSONE (DELTASONE) 10 MG tablet  ALPRAZolam (XANAX) 0.25 MG tablet  amphetamine-dextroamphetamine (ADDERALL XR) 20 MG 24 hr capsule  busPIRone (BUSPAR) 5 MG tablet  escitalopram (LEXAPRO) 20 MG tablet  oxyCODONE (ROXICODONE) 5 MG tablet  predniSONE (DELTASONE) 20 MG tablet          Review of Systems   Constitutional: Negative.    Musculoskeletal:        Left foot pain, swelling, redness   All other systems reviewed and are negative.      Physical Exam   BP: (!) 154/101  Pulse: 79  Temp: 98.6  F (37  C)  Resp: 20  SpO2: 99 %      Physical Exam  Constitutional:       General: He is not in acute distress.     Appearance: Normal appearance. He is well-developed. He is not ill-appearing, toxic-appearing or diaphoretic.   HENT:      Head: Normocephalic and atraumatic.   Eyes:      Conjunctiva/sclera: Conjunctivae normal.   Cardiovascular:      Pulses: Normal pulses.   Pulmonary:      Effort: Pulmonary effort is normal.   Musculoskeletal:      Cervical back: Neck supple.      Left ankle: Normal. No swelling, deformity or ecchymosis. No tenderness. Normal range of motion.      Left foot: Decreased range of motion. Normal capillary refill. Swelling, tenderness and bony tenderness present. No deformity or crepitus. Normal pulse.      Comments: Tenderness to palpation (and even light touch) diffusely across left foot, maximally at the great toe MTP joint.  There is associated swelling and erythema across the foot as well.  No breaks in the skin.  Patient able to move toes.   Skin:     General: Skin is warm and dry.      Capillary  Refill: Capillary refill takes less than 2 seconds.   Neurological:      General: No focal deficit present.      Mental Status: He is alert.      Sensory: Sensation is intact.      Motor: Motor function is intact.         ED Course                 Procedures    No results found for this or any previous visit (from the past 24 hour(s)).    Medications - No data to display    Assessments & Plan (with Medical Decision Making)     Pt is a 37 year old male who presents with complaints of recurrence of left foot pain, swelling, and redness.  Patient states he was evaluated in the urgent care on 5/8/2023 for left foot pain.  He was diagnosed with suspected gout as he has a history of this in the past with similar symptoms.  He was prescribed 5 days of prednisone which he completed with initial improvement in his symptoms however upon completing the course of steroids, he had a recurrence of the symptoms.  Patient states it felt like he did not have enough or longer course of prednisone as it seemed to initially be helping with his symptoms.  Patient denies any preceding injury or trauma to the foot.  No fevers    Pt is afebrile on arrival.  Exam as above.  History and exam concerning for gout.  Patient has history of similar in the past.  He had initial improvement on burst of prednisone however symptoms worsened upon completion of this.  We will therefore place on longer taper.  Discussed obtaining x-ray imaging to rule out other potential causes however patient declines.  Encouraged additional symptomatic treatments at home.  Return precautions were reviewed.  Hand-outs were provided.    Patient was sent with Prednisone burst and taper, Oxycodone for severe pain and was instructed to follow-up with PCP for continued care and management.  He is to return to the ED for persistent and/or worsening symptoms.  Patient expressed understanding of the diagnosis and plan and was discharged home in good condition.    I have  reviewed the nursing notes.    I have reviewed the findings, diagnosis, plan and need for follow up with the patient.    Discharge Medication List as of 5/14/2023 11:52 AM      START taking these medications    Details   !! oxyCODONE (ROXICODONE) 5 MG tablet Take 1 tablet (5 mg) by mouth every 6 hours as needed for pain, Disp-5 tablet, R-0, Local Print      !! predniSONE (DELTASONE) 10 MG tablet Take 4 tablets daily for 5 days,  take 2 tablets daily for 3 days, take 1 tablet daily for 3 days, take half a tablet for 3 days., Disp-32 tablet, R-0, E-Prescribe       !! - Potential duplicate medications found. Please discuss with provider.          Final diagnoses:   Acute gouty arthritis       5/14/2023   [facility] EMERGENCY DEPT      Disclaimer:  This note consists of symbols derived from keyboarding, dictation and/or voice recognition software.  As a result, there may be errors in the script that have gone undetected.  Please consider this when interpreting information found in this chart.     Kenisha Shen PA-C  05/14/23 [id number]

## 2023-06-26 DIAGNOSIS — F33.1 MAJOR DEPRESSIVE DISORDER, RECURRENT EPISODE, MODERATE (H): ICD-10-CM

## 2023-06-26 DIAGNOSIS — F41.9 ANXIETY: ICD-10-CM

## 2023-06-29 RX ORDER — ESCITALOPRAM OXALATE 20 MG/1
20 TABLET ORAL DAILY
Qty: 90 TABLET | Refills: 0 | Status: SHIPPED | OUTPATIENT
Start: 2023-06-29 | End: 2023-12-28

## 2023-06-29 NOTE — TELEPHONE ENCOUNTER
No call back, no pending appt.      12/4/2019     3:51 PM 1/5/2021     2:30 PM 11/3/2022     1:55 PM   PHQ   PHQ-9 Total Score 3 7 8   Q9: Thoughts of better off dead/self-harm past 2 weeks Not at all Not at all Not at all          12/4/2019     3:51 PM 1/5/2021     2:30 PM 11/3/2022     1:56 PM   ITA-7 SCORE   Total Score   12 (moderate anxiety)   Total Score 4 1 12     Routing refill request to provider for review/approval because:  PHQ9> 5.   BOLIVAR Deyr RN

## 2023-12-28 DIAGNOSIS — F33.1 MAJOR DEPRESSIVE DISORDER, RECURRENT EPISODE, MODERATE (H): ICD-10-CM

## 2023-12-28 DIAGNOSIS — F41.9 ANXIETY: ICD-10-CM

## 2023-12-28 NOTE — TELEPHONE ENCOUNTER
Patient reports he had been taking med thoughout, until he recently ran out. He reports he is experiencing withdrawls. He was able to schedule OV for jan, could refill be provided until visit?  Next 5 appointments (look out 90 days)      Zeeshan 15, 2024  8:30 AM  (Arrive by 8:10 AM)  Provider Visit with CHESTER Tristan CNP  [facility] ([facility] ) [address] [place] [phone] [phone]

## 2023-12-29 RX ORDER — ESCITALOPRAM OXALATE 20 MG/1
20 TABLET ORAL DAILY
Qty: 90 TABLET | Refills: 0 | Status: SHIPPED | OUTPATIENT
Start: 2023-12-29 | End: 2024-01-15

## 2024-01-01 ENCOUNTER — HOSPITAL ENCOUNTER (EMERGENCY)
Facility: CLINIC | Age: 39
Discharge: HOME | End: 2024-01-01
Attending: FAMILY MEDICINE | Admitting: FAMILY MEDICINE
Payer: COMMERCIAL

## 2024-01-01 VITALS
BODY MASS INDEX: 28.23 KG/M2 | SYSTOLIC BLOOD PRESSURE: 140 MMHG | DIASTOLIC BLOOD PRESSURE: 90 MMHG | HEIGHT: 74 IN | HEART RATE: 65 BPM | OXYGEN SATURATION: 97 % | RESPIRATION RATE: 18 BRPM | TEMPERATURE: 98.8 F | WEIGHT: 220 LBS

## 2024-01-01 DIAGNOSIS — R21 RASH AND NONSPECIFIC SKIN ERUPTION: ICD-10-CM

## 2024-01-01 PROCEDURE — 250N000012 HC RX MED GY IP 250 OP 636 PS 637: Performed by: FAMILY MEDICINE

## 2024-01-01 PROCEDURE — 99283 EMERGENCY DEPT VISIT LOW MDM: CPT | Performed by: FAMILY MEDICINE

## 2024-01-01 PROCEDURE — 99284 EMERGENCY DEPT VISIT MOD MDM: CPT | Performed by: FAMILY MEDICINE

## 2024-01-01 RX ORDER — PREDNISONE 20 MG/1
40 TABLET ORAL DAILY
Qty: 20 TABLET | Refills: 0 | Status: SHIPPED | OUTPATIENT
Start: 2024-01-01 | End: 2024-01-11

## 2024-01-01 RX ORDER — PREDNISONE 20 MG/1
40 TABLET ORAL ONCE
Status: COMPLETED | OUTPATIENT
Start: 2024-01-01 | End: 2024-01-01

## 2024-01-01 RX ADMIN — PREDNISONE 40 MG: 20 TABLET ORAL at 20:29

## 2024-01-01 ASSESSMENT — ACTIVITIES OF DAILY LIVING (ADL): ADLS_ACUITY_SCORE: 33

## 2024-01-02 NOTE — ED PROVIDER NOTES
History     Chief Complaint   Patient presents with    Arthritis    Medication Refill     HPI  Evangelist Macario is a 38 year old male, past medical history significant for ADHD, anxiety, depression, tobacco use disorder, presents to the emergency department with a request for prednisone for acute gout flare.  History is obtained from the patient who states that he has had gout many times in his right foot and he began a couple of days ago with discomfort in his left foot that feels very similar.  He denies any trauma or injury of any kind.  No fever chills or sweats.  He would like a prescription for prednisone as its worked for him in the past for acute gout which he presumes that this is.      Allergies:  No Known Allergies    Problem List:    Patient Active Problem List    Diagnosis Date Noted    Attention deficit hyperactivity disorder (ADHD), predominantly inattentive type 07/20/2018     Priority: Medium    Anxiety 05/10/2012     Priority: Medium    Moderate Depression [296.32] 01/30/2012     Priority: Medium    CARDIOVASCULAR SCREENING; LDL GOAL LESS THAN 160 10/31/2010     Priority: Medium    Tobacco use disorder 12/09/2008     Priority: Medium        Past Medical History:    No past medical history on file.    Past Surgical History:    Past Surgical History:   Procedure Laterality Date    SURGICAL HISTORY OF -   3/25/2004    Right Inguinal Herniorrhaphy with mesh    SURGICAL HISTORY OF -   7/22/1998    Open Appendectomy    SURGICAL HISTORY OF -   7/22/1998    Open Left inguinal Heerniorrhaphy.       Family History:    Family History   Problem Relation Age of Onset    Diabetes Brother     Cancer Father         pancreatic       Social History:  Marital Status:  Single [1]  Social History     Tobacco Use    Smoking status: Former     Packs/day: 0.50     Years: 1.00     Additional pack years: 0.00     Total pack years: 0.50     Types: Cigarettes     Quit date: 5/26/2013     Years since quitting: 10.6     "Smokeless tobacco: Never   Vaping Use    Vaping Use: Never used   Substance Use Topics    Alcohol use: Yes     Comment: once in while    Drug use: No        Medications:    predniSONE (DELTASONE) 20 MG tablet  ALPRAZolam (XANAX) 0.25 MG tablet  amphetamine-dextroamphetamine (ADDERALL XR) 20 MG 24 hr capsule  busPIRone (BUSPAR) 5 MG tablet  escitalopram (LEXAPRO) 20 MG tablet  oxyCODONE (ROXICODONE) 5 MG tablet  predniSONE (DELTASONE) 20 MG tablet          Review of Systems   All other systems reviewed and are negative.      Physical Exam   BP: (!) 140/90  Pulse: 65  Temp: 98.8  F (37.1  C)  Resp: 18  Height: 188 cm (6' 2\")  Weight: 99.8 kg (220 lb)  SpO2: 97 %      Physical Exam  Vitals and nursing note reviewed.   Constitutional:       General: He is not in acute distress.     Appearance: Normal appearance. He is normal weight. He is not ill-appearing.   Musculoskeletal:      Comments: Exam of the left foot finds faint erythema in the area of the fourth and fifth metatarsal heads.  No gross deformity, mild tenderness.  No lymphangitic streaking.   Neurological:      Mental Status: He is alert.         ED Course          8:27 PM  Differential diagnostic considerations were reviewed with the patient at the time of his evaluation.  He does not wish further evaluation at this time but would like to treated as though it may be gout with a course of oral prednisone.  He is cautioned to return to the emergency department if worse or changes.         Procedures           No results found for this or any previous visit (from the past 24 hour(s)).    Medications   predniSONE (DELTASONE) tablet 40 mg (has no administration in time range)       Assessments & Plan (with Medical Decision Making)   Assessment and plan with medical decision making at the time stamp above.      Disclaimer: This note consists of symbols derived from keyboarding, dictation and/or voice recognition software. As a result, there may be errors in the " script that have gone undetected. Please consider this when interpreting information found in this chart.      I have reviewed the nursing notes.    I have reviewed the findings, diagnosis, plan and need for follow up with the patient.        New Prescriptions    PREDNISONE (DELTASONE) 20 MG TABLET    Take 2 tablets (40 mg) by mouth daily for 10 days       Final diagnoses:   Rash and nonspecific skin eruption - Left foot, patient concern for gout       1/1/2024   [facility] EMERGENCY DEPT       Albert Solis MD  01/01/24 2028

## 2024-01-02 NOTE — ED TRIAGE NOTES
Gout flare L foot.  Looking for prednisone Rx     Triage Assessment (Adult)       Row Name 01/01/24 6130          Triage Assessment    Airway WDL WDL        Cognitive/Neuro/Behavioral WDL    Cognitive/Neuro/Behavioral WDL WDL

## 2024-01-15 ENCOUNTER — OFFICE VISIT (OUTPATIENT)
Dept: FAMILY MEDICINE | Facility: CLINIC | Age: 39
End: 2024-01-15
Payer: COMMERCIAL

## 2024-01-15 VITALS
HEART RATE: 73 BPM | BODY MASS INDEX: 29.08 KG/M2 | HEIGHT: 74 IN | SYSTOLIC BLOOD PRESSURE: 136 MMHG | RESPIRATION RATE: 20 BRPM | TEMPERATURE: 97.6 F | WEIGHT: 226.6 LBS | OXYGEN SATURATION: 99 % | DIASTOLIC BLOOD PRESSURE: 80 MMHG

## 2024-01-15 DIAGNOSIS — Z11.59 NEED FOR HEPATITIS C SCREENING TEST: ICD-10-CM

## 2024-01-15 DIAGNOSIS — F41.9 ANXIETY: Primary | ICD-10-CM

## 2024-01-15 DIAGNOSIS — Z13.220 SCREENING FOR HYPERLIPIDEMIA: ICD-10-CM

## 2024-01-15 DIAGNOSIS — F33.1 MAJOR DEPRESSIVE DISORDER, RECURRENT EPISODE, MODERATE (H): ICD-10-CM

## 2024-01-15 DIAGNOSIS — M10.00 IDIOPATHIC GOUT, UNSPECIFIED CHRONICITY, UNSPECIFIED SITE: ICD-10-CM

## 2024-01-15 DIAGNOSIS — Z11.4 SCREENING FOR HIV (HUMAN IMMUNODEFICIENCY VIRUS): ICD-10-CM

## 2024-01-15 LAB
ANION GAP SERPL CALCULATED.3IONS-SCNC: 11 MMOL/L (ref 7–15)
BUN SERPL-MCNC: 8.9 MG/DL (ref 6–20)
CALCIUM SERPL-MCNC: 9.5 MG/DL (ref 8.6–10)
CHLORIDE SERPL-SCNC: 98 MMOL/L (ref 98–107)
CHOLEST SERPL-MCNC: 223 MG/DL
CREAT SERPL-MCNC: 0.83 MG/DL (ref 0.67–1.17)
DEPRECATED HCO3 PLAS-SCNC: 24 MMOL/L (ref 22–29)
EGFRCR SERPLBLD CKD-EPI 2021: >90 ML/MIN/1.73M2
FASTING STATUS PATIENT QL REPORTED: YES
GLUCOSE SERPL-MCNC: 115 MG/DL (ref 70–99)
HCV AB SERPL QL IA: NONREACTIVE
HDLC SERPL-MCNC: 43 MG/DL
HIV 1+2 AB+HIV1 P24 AG SERPL QL IA: NONREACTIVE
LDLC SERPL CALC-MCNC: 111 MG/DL
NONHDLC SERPL-MCNC: 180 MG/DL
POTASSIUM SERPL-SCNC: 4 MMOL/L (ref 3.4–5.3)
SODIUM SERPL-SCNC: 133 MMOL/L (ref 135–145)
TRIGL SERPL-MCNC: 343 MG/DL
URATE SERPL-MCNC: 7.8 MG/DL (ref 3.4–7)

## 2024-01-15 PROCEDURE — 84550 ASSAY OF BLOOD/URIC ACID: CPT | Performed by: NURSE PRACTITIONER

## 2024-01-15 PROCEDURE — 86803 HEPATITIS C AB TEST: CPT | Performed by: NURSE PRACTITIONER

## 2024-01-15 PROCEDURE — 87389 HIV-1 AG W/HIV-1&-2 AB AG IA: CPT | Performed by: NURSE PRACTITIONER

## 2024-01-15 PROCEDURE — 80048 BASIC METABOLIC PNL TOTAL CA: CPT | Performed by: NURSE PRACTITIONER

## 2024-01-15 PROCEDURE — G2211 COMPLEX E/M VISIT ADD ON: HCPCS | Performed by: NURSE PRACTITIONER

## 2024-01-15 PROCEDURE — 36415 COLL VENOUS BLD VENIPUNCTURE: CPT | Performed by: NURSE PRACTITIONER

## 2024-01-15 PROCEDURE — 80061 LIPID PANEL: CPT | Performed by: NURSE PRACTITIONER

## 2024-01-15 PROCEDURE — 99214 OFFICE O/P EST MOD 30 MIN: CPT | Performed by: NURSE PRACTITIONER

## 2024-01-15 RX ORDER — ESCITALOPRAM OXALATE 20 MG/1
20 TABLET ORAL DAILY
Qty: 90 TABLET | Refills: 3 | Status: SHIPPED | OUTPATIENT
Start: 2024-01-15

## 2024-01-15 RX ORDER — ALLOPURINOL 100 MG/1
TABLET ORAL
Qty: 63 TABLET | Refills: 0 | Status: SHIPPED | OUTPATIENT
Start: 2024-01-15 | End: 2024-02-12

## 2024-01-15 RX ORDER — ALPRAZOLAM 0.25 MG
TABLET ORAL
Qty: 15 TABLET | Refills: 0 | Status: SHIPPED | OUTPATIENT
Start: 2024-01-15

## 2024-01-15 RX ORDER — ALLOPURINOL 300 MG/1
300 TABLET ORAL DAILY
Qty: 90 TABLET | Refills: 3 | Status: SHIPPED | OUTPATIENT
Start: 2024-02-15

## 2024-01-15 ASSESSMENT — ANXIETY QUESTIONNAIRES
7. FEELING AFRAID AS IF SOMETHING AWFUL MIGHT HAPPEN: SEVERAL DAYS
GAD7 TOTAL SCORE: 8
5. BEING SO RESTLESS THAT IT IS HARD TO SIT STILL: MORE THAN HALF THE DAYS
4. TROUBLE RELAXING: SEVERAL DAYS
3. WORRYING TOO MUCH ABOUT DIFFERENT THINGS: SEVERAL DAYS
2. NOT BEING ABLE TO STOP OR CONTROL WORRYING: SEVERAL DAYS
6. BECOMING EASILY ANNOYED OR IRRITABLE: SEVERAL DAYS
IF YOU CHECKED OFF ANY PROBLEMS ON THIS QUESTIONNAIRE, HOW DIFFICULT HAVE THESE PROBLEMS MADE IT FOR YOU TO DO YOUR WORK, TAKE CARE OF THINGS AT HOME, OR GET ALONG WITH OTHER PEOPLE: SOMEWHAT DIFFICULT
1. FEELING NERVOUS, ANXIOUS, OR ON EDGE: SEVERAL DAYS
8. IF YOU CHECKED OFF ANY PROBLEMS, HOW DIFFICULT HAVE THESE MADE IT FOR YOU TO DO YOUR WORK, TAKE CARE OF THINGS AT HOME, OR GET ALONG WITH OTHER PEOPLE?: SOMEWHAT DIFFICULT
7. FEELING AFRAID AS IF SOMETHING AWFUL MIGHT HAPPEN: SEVERAL DAYS

## 2024-01-15 ASSESSMENT — PAIN SCALES - GENERAL: PAINLEVEL: NO PAIN (0)

## 2024-01-15 ASSESSMENT — PATIENT HEALTH QUESTIONNAIRE - PHQ9
SUM OF ALL RESPONSES TO PHQ QUESTIONS 1-9: 5
SUM OF ALL RESPONSES TO PHQ QUESTIONS 1-9: 5
10. IF YOU CHECKED OFF ANY PROBLEMS, HOW DIFFICULT HAVE THESE PROBLEMS MADE IT FOR YOU TO DO YOUR WORK, TAKE CARE OF THINGS AT HOME, OR GET ALONG WITH OTHER PEOPLE: SOMEWHAT DIFFICULT

## 2024-01-15 NOTE — PROGRESS NOTES
"  Assessment & Plan     Anxiety   Controlled no change in treatment plan   - escitalopram (LEXAPRO) 20 MG tablet  Dispense: 90 tablet; Refill: 3  - ALPRAZolam (XANAX) 0.25 MG tablet  Dispense: 15 tablet; Refill: 0    Moderate Depression [296.32]   Controlled no change in treatment plan   - escitalopram (LEXAPRO) 20 MG tablet  Dispense: 90 tablet; Refill: 3  - Basic metabolic panel  (Ca, Cl, CO2, Creat, Gluc, K, Na, BUN)  - Basic metabolic panel  (Ca, Cl, CO2, Creat, Gluc, K, Na, BUN)    Idiopathic gout, unspecified chronicity, unspecified site  Has 6 flareups a year will start Zyloprim   - Uric acid  - allopurinol (ZYLOPRIM) 100 MG tablet  Dispense: 63 tablet; Refill: 0  - allopurinol (ZYLOPRIM) 300 MG tablet  Dispense: 90 tablet; Refill: 3  - Uric acid    Screening for HIV (human immunodeficiency virus)  - HIV Antigen Antibody Combo  - HIV Antigen Antibody Combo    Need for hepatitis C screening test  - Hepatitis C Screen Reflex to HCV RNA Quant and Genotype  - Hepatitis C Screen Reflex to HCV RNA Quant and Genotype    Screening for hyperlipidemia  - Lipid panel reflex to direct LDL Non-fasting  - Lipid panel reflex to direct LDL Non-fasting    The longitudinal plan of care for  Evangelist was addressed during this visit. Due to the added complexity in care, I will continue to support Evangelist in the subsequent management of this condition(s) and with the ongoing continuity of care of this condition(s).    BMI:   Estimated body mass index is 29.09 kg/m  as calculated from the following:    Height as of this encounter: 1.88 m (6' 2\").    Weight as of this encounter: 102.8 kg (226 lb 9.6 oz).   Weight management plan: Discussed healthy diet and exercise guidelines    See Patient Instructions    CHESTER Tristan CNP  [facility]    Esperanza Blanca is a 38 year old, presenting for the following health issues:  Anxiety        1/15/2024     8:12 AM   Additional Questions   Roomed by Lorena JAY" "      History of Present Illness       Mental Health Follow-up:  Patient presents to follow-up on Anxiety.    Patient's anxiety since last visit has been:  Medium  The patient is having other symptoms associated with anxiety.  Any significant life events: No  Patient is not feeling anxious or having panic attacks.  Patient has no concerns about alcohol or drug use.    He eats 2-3 servings of fruits and vegetables daily.He consumes 2 sweetened beverage(s) daily.He exercises with enough effort to increase his heart rate 20 to 29 minutes per day.  He exercises with enough effort to increase his heart rate 4 days per week. He is missing 1 dose(s) of medications per week.         1/5/2021     2:30 PM 11/3/2022     1:55 PM 1/15/2024     8:07 AM   PHQ   PHQ-9 Total Score 7 8 5   Q9: Thoughts of better off dead/self-harm past 2 weeks Not at all Not at all Not at all          1/5/2021     2:30 PM 11/3/2022     1:56 PM 1/15/2024     8:08 AM   ITA-7 SCORE   Total Score  12 (moderate anxiety) 8 (mild anxiety)   Total Score 1 12 8        Review of Systems   Constitutional, HEENT, cardiovascular, pulmonary, gi and gu systems are negative, except as otherwise noted.      Objective    /80   Pulse 73   Temp 97.6  F (36.4  C) (Tympanic)   Resp 20   Ht 1.88 m (6' 2\")   Wt 102.8 kg (226 lb 9.6 oz)   SpO2 99%   BMI 29.09 kg/m    Body mass index is 29.09 kg/m .  Physical Exam   GENERAL: healthy, alert and no distress  EYES: Eyes grossly normal to inspection, PERRL and conjunctivae and sclerae normal  RESP: lungs clear to auscultation - no rales, rhonchi or wheezes  CV: regular rate and rhythm, normal S1 S2, no S3 or S4, no murmur, click or rub, no peripheral edema and peripheral pulses strong  MS: no gross musculoskeletal defects noted, no edema  SKIN: no suspicious lesions or rashes  NEURO: Normal strength and tone, mentation intact and speech normal  PSYCH: mentation appears normal, affect normal/bright                    "

## 2024-04-15 ENCOUNTER — TELEPHONE (OUTPATIENT)
Dept: FAMILY MEDICINE | Facility: CLINIC | Age: 39
End: 2024-04-15
Payer: COMMERCIAL

## 2024-04-15 DIAGNOSIS — M10.00 IDIOPATHIC GOUT, UNSPECIFIED CHRONICITY, UNSPECIFIED SITE: ICD-10-CM

## 2024-04-15 DIAGNOSIS — F41.9 ANXIETY: ICD-10-CM

## 2024-04-15 DIAGNOSIS — F33.1 MAJOR DEPRESSIVE DISORDER, RECURRENT EPISODE, MODERATE (H): ICD-10-CM

## 2024-04-15 RX ORDER — ALLOPURINOL 300 MG/1
300 TABLET ORAL DAILY
Qty: 90 TABLET | Refills: 3 | Status: CANCELLED | OUTPATIENT
Start: 2024-04-15

## 2024-04-15 RX ORDER — ESCITALOPRAM OXALATE 20 MG/1
20 TABLET ORAL DAILY
Qty: 90 TABLET | Refills: 3 | Status: CANCELLED | OUTPATIENT
Start: 2024-04-15

## 2024-04-15 NOTE — TELEPHONE ENCOUNTER
Evangelist notified these should already be at the pharmacy. He will call and speak to them again.    Seema Olivares RN;

## 2024-05-11 DIAGNOSIS — M10.00 IDIOPATHIC GOUT, UNSPECIFIED CHRONICITY, UNSPECIFIED SITE: ICD-10-CM

## 2024-05-13 RX ORDER — ALLOPURINOL 100 MG/1
TABLET ORAL
Qty: 63 TABLET | Refills: 0 | OUTPATIENT
Start: 2024-05-13

## 2024-06-16 ENCOUNTER — HEALTH MAINTENANCE LETTER (OUTPATIENT)
Age: 39
End: 2024-06-16

## 2025-02-10 ENCOUNTER — OFFICE VISIT (OUTPATIENT)
Dept: FAMILY MEDICINE | Facility: CLINIC | Age: 40
End: 2025-02-10
Payer: COMMERCIAL

## 2025-02-10 VITALS
SYSTOLIC BLOOD PRESSURE: 128 MMHG | BODY MASS INDEX: 29.49 KG/M2 | OXYGEN SATURATION: 99 % | DIASTOLIC BLOOD PRESSURE: 84 MMHG | RESPIRATION RATE: 16 BRPM | HEIGHT: 74 IN | WEIGHT: 229.8 LBS | TEMPERATURE: 97.5 F | HEART RATE: 78 BPM

## 2025-02-10 DIAGNOSIS — F33.1 MAJOR DEPRESSIVE DISORDER, RECURRENT EPISODE, MODERATE (H): ICD-10-CM

## 2025-02-10 DIAGNOSIS — F90.2 ATTENTION DEFICIT HYPERACTIVITY DISORDER (ADHD), COMBINED TYPE: ICD-10-CM

## 2025-02-10 DIAGNOSIS — R06.83 SNORINGS: ICD-10-CM

## 2025-02-10 DIAGNOSIS — Z13.220 SCREENING FOR HYPERLIPIDEMIA: ICD-10-CM

## 2025-02-10 DIAGNOSIS — F41.9 ANXIETY: ICD-10-CM

## 2025-02-10 DIAGNOSIS — M10.00 IDIOPATHIC GOUT, UNSPECIFIED CHRONICITY, UNSPECIFIED SITE: Primary | ICD-10-CM

## 2025-02-10 LAB
ANION GAP SERPL CALCULATED.3IONS-SCNC: 7 MMOL/L (ref 7–15)
BUN SERPL-MCNC: 8.9 MG/DL (ref 6–20)
CALCIUM SERPL-MCNC: 9.5 MG/DL (ref 8.8–10.4)
CHLORIDE SERPL-SCNC: 100 MMOL/L (ref 98–107)
CHOLEST SERPL-MCNC: 196 MG/DL
CREAT SERPL-MCNC: 1.05 MG/DL (ref 0.67–1.17)
EGFRCR SERPLBLD CKD-EPI 2021: >90 ML/MIN/1.73M2
FASTING STATUS PATIENT QL REPORTED: NO
FASTING STATUS PATIENT QL REPORTED: NO
GLUCOSE SERPL-MCNC: 102 MG/DL (ref 70–99)
HCO3 SERPL-SCNC: 30 MMOL/L (ref 22–29)
HDLC SERPL-MCNC: 38 MG/DL
LDLC SERPL CALC-MCNC: 119 MG/DL
NONHDLC SERPL-MCNC: 158 MG/DL
POTASSIUM SERPL-SCNC: 4.6 MMOL/L (ref 3.4–5.3)
SODIUM SERPL-SCNC: 137 MMOL/L (ref 135–145)
TRIGL SERPL-MCNC: 196 MG/DL
URATE SERPL-MCNC: 6.6 MG/DL (ref 3.4–7)

## 2025-02-10 PROCEDURE — 99214 OFFICE O/P EST MOD 30 MIN: CPT | Performed by: NURSE PRACTITIONER

## 2025-02-10 PROCEDURE — 80061 LIPID PANEL: CPT | Performed by: NURSE PRACTITIONER

## 2025-02-10 PROCEDURE — G2211 COMPLEX E/M VISIT ADD ON: HCPCS | Performed by: NURSE PRACTITIONER

## 2025-02-10 PROCEDURE — 36415 COLL VENOUS BLD VENIPUNCTURE: CPT | Performed by: NURSE PRACTITIONER

## 2025-02-10 PROCEDURE — 84550 ASSAY OF BLOOD/URIC ACID: CPT | Performed by: NURSE PRACTITIONER

## 2025-02-10 PROCEDURE — 80048 BASIC METABOLIC PNL TOTAL CA: CPT | Performed by: NURSE PRACTITIONER

## 2025-02-10 RX ORDER — DEXTROAMPHETAMINE SACCHARATE, AMPHETAMINE ASPARTATE MONOHYDRATE, DEXTROAMPHETAMINE SULFATE AND AMPHETAMINE SULFATE 5; 5; 5; 5 MG/1; MG/1; MG/1; MG/1
20 CAPSULE, EXTENDED RELEASE ORAL DAILY
Qty: 30 CAPSULE | Refills: 0 | Status: SHIPPED | OUTPATIENT
Start: 2025-04-11 | End: 2025-05-11

## 2025-02-10 RX ORDER — DEXTROAMPHETAMINE SACCHARATE, AMPHETAMINE ASPARTATE MONOHYDRATE, DEXTROAMPHETAMINE SULFATE AND AMPHETAMINE SULFATE 5; 5; 5; 5 MG/1; MG/1; MG/1; MG/1
20 CAPSULE, EXTENDED RELEASE ORAL DAILY
Qty: 30 CAPSULE | Refills: 0 | Status: SHIPPED | OUTPATIENT
Start: 2025-02-10 | End: 2025-03-12

## 2025-02-10 RX ORDER — DEXTROAMPHETAMINE SACCHARATE, AMPHETAMINE ASPARTATE MONOHYDRATE, DEXTROAMPHETAMINE SULFATE AND AMPHETAMINE SULFATE 5; 5; 5; 5 MG/1; MG/1; MG/1; MG/1
20 CAPSULE, EXTENDED RELEASE ORAL DAILY
Qty: 30 CAPSULE | Refills: 0 | Status: SHIPPED | OUTPATIENT
Start: 2025-03-12 | End: 2025-04-11

## 2025-02-10 RX ORDER — ALLOPURINOL 300 MG/1
300 TABLET ORAL DAILY
Qty: 90 TABLET | Refills: 3 | Status: SHIPPED | OUTPATIENT
Start: 2025-02-10

## 2025-02-10 RX ORDER — ESCITALOPRAM OXALATE 20 MG/1
20 TABLET ORAL DAILY
Qty: 90 TABLET | Refills: 3 | Status: SHIPPED | OUTPATIENT
Start: 2025-02-10

## 2025-02-10 RX ORDER — ALPRAZOLAM 0.25 MG
TABLET ORAL
Qty: 15 TABLET | Refills: 0 | Status: SHIPPED | OUTPATIENT
Start: 2025-02-10

## 2025-02-10 ASSESSMENT — ANXIETY QUESTIONNAIRES
GAD7 TOTAL SCORE: 6
6. BECOMING EASILY ANNOYED OR IRRITABLE: SEVERAL DAYS
3. WORRYING TOO MUCH ABOUT DIFFERENT THINGS: SEVERAL DAYS
8. IF YOU CHECKED OFF ANY PROBLEMS, HOW DIFFICULT HAVE THESE MADE IT FOR YOU TO DO YOUR WORK, TAKE CARE OF THINGS AT HOME, OR GET ALONG WITH OTHER PEOPLE?: SOMEWHAT DIFFICULT
4. TROUBLE RELAXING: SEVERAL DAYS
1. FEELING NERVOUS, ANXIOUS, OR ON EDGE: NOT AT ALL
7. FEELING AFRAID AS IF SOMETHING AWFUL MIGHT HAPPEN: SEVERAL DAYS
GAD7 TOTAL SCORE: 6
7. FEELING AFRAID AS IF SOMETHING AWFUL MIGHT HAPPEN: SEVERAL DAYS
IF YOU CHECKED OFF ANY PROBLEMS ON THIS QUESTIONNAIRE, HOW DIFFICULT HAVE THESE PROBLEMS MADE IT FOR YOU TO DO YOUR WORK, TAKE CARE OF THINGS AT HOME, OR GET ALONG WITH OTHER PEOPLE: SOMEWHAT DIFFICULT
5. BEING SO RESTLESS THAT IT IS HARD TO SIT STILL: SEVERAL DAYS
2. NOT BEING ABLE TO STOP OR CONTROL WORRYING: SEVERAL DAYS
GAD7 TOTAL SCORE: 6

## 2025-02-10 ASSESSMENT — PAIN SCALES - GENERAL: PAINLEVEL_OUTOF10: NO PAIN (0)

## 2025-02-10 ASSESSMENT — PATIENT HEALTH QUESTIONNAIRE - PHQ9
10. IF YOU CHECKED OFF ANY PROBLEMS, HOW DIFFICULT HAVE THESE PROBLEMS MADE IT FOR YOU TO DO YOUR WORK, TAKE CARE OF THINGS AT HOME, OR GET ALONG WITH OTHER PEOPLE: SOMEWHAT DIFFICULT
SUM OF ALL RESPONSES TO PHQ QUESTIONS 1-9: 8
SUM OF ALL RESPONSES TO PHQ QUESTIONS 1-9: 8

## 2025-02-10 NOTE — PROGRESS NOTES
"    Assessment & Plan   Problem List Items Addressed This Visit          Behavioral    Moderate Depression [296.32]    Relevant Medications    escitalopram (LEXAPRO) 20 MG tablet    ALPRAZolam (XANAX) 0.25 MG tablet    Other Relevant Orders    REVIEW OF HEALTH MAINTENANCE PROTOCOL ORDERS (Completed)       Other    Anxiety    Relevant Medications    escitalopram (LEXAPRO) 20 MG tablet    ALPRAZolam (XANAX) 0.25 MG tablet     Other Visit Diagnoses       Idiopathic gout, unspecified chronicity, unspecified site    -  Primary    Relevant Medications    allopurinol (ZYLOPRIM) 300 MG tablet    Other Relevant Orders    Uric acid    Basic metabolic panel  (Ca, Cl, CO2, Creat, Gluc, K, Na, BUN)    Attention deficit hyperactivity disorder (ADHD), combined type        Relevant Medications    amphetamine-dextroamphetamine (ADDERALL XR) 20 MG 24 hr capsule    amphetamine-dextroamphetamine (ADDERALL XR) 20 MG 24 hr capsule (Start on 3/12/2025)    amphetamine-dextroamphetamine (ADDERALL XR) 20 MG 24 hr capsule (Start on 4/11/2025)    Screening for hyperlipidemia        Relevant Orders    Lipid panel reflex to direct LDL Fasting    [place]        Relevant Orders    Adult Sleep Eval & Management  Referral           Anxiety has been controlled patient is concerned about ADHD is now taking an engineering class would like to restart his ADHD medications reordered today       BMI  Estimated body mass index is 29.91 kg/m  as calculated from the following:    Height as of this encounter: 1.867 m (6' 1.5\").    Weight as of this encounter: 104.2 kg (229 lb 12.8 oz).   Weight management plan: Discussed healthy diet and exercise guidelines    See Patient Instructions      Esperanza Blanca is a 39 year old, presenting for the following health issues:  Sleep Problem, Depression, and Anxiety        2/10/2025     7:56 AM   Additional Questions   Roomed by Carol CH CMA   Accompanied by Self       History of Present Illness       Mental " "Health Follow-up:  Patient presents to follow-up on Depression & Anxiety.Patient's depression since last visit has been:  Good  The patient is having other symptoms associated with depression.  Patient's anxiety since last visit has been:  Good  The patient is having other symptoms associated with anxiety.  Any significant life events: other  Patient is feeling anxious or having panic attacks.  Patient has no concerns about alcohol or drug use.    He eats 0-1 servings of fruits and vegetables daily.He consumes 2 sweetened beverage(s) daily.He exercises with enough effort to increase his heart rate 30 to 60 minutes per day.  He exercises with enough effort to increase his heart rate 4 days per week.   He is taking medications regularly.     Concern - Sleep apnea   Onset: ongoing   Description: wife says that he just stops breathing and has been snoring a lot   Intensity: moderate-severe   Progression of Symptoms:  worsening  Accompanying Signs & Symptoms: none  Previous history of similar problem: yes   Precipitating factors:        Worsened by: na   Alleviating factors:        Improved by: na  Therapies tried and outcome:  none     Concern - Attention/Focus   Onset: ongoing   Description: Says that he is taking some classes and having trouble focusing while studying   Intensity: mild  Progression of Symptoms:  same  Accompanying Signs & Symptoms: none  Previous history of similar problem: yes  Precipitating factors:        Worsened by:   Alleviating factors:        Improved by: medication  Therapies tried and outcome: None      Review of Systems  Constitutional, HEENT, cardiovascular, pulmonary, gi and gu systems are negative, except as otherwise noted.      Objective    /84   Pulse 78   Temp 97.5  F (36.4  C) (Tympanic)   Resp 16   Ht 1.867 m (6' 1.5\")   Wt 104.2 kg (229 lb 12.8 oz)   SpO2 99%   BMI 29.91 kg/m    Body mass index is 29.91 kg/m .  Physical Exam   GENERAL: alert and no distress  EYES: " Eyes grossly normal to inspection, PERRL and conjunctivae and sclerae normal  RESP: lungs clear to auscultation - no rales, rhonchi or wheezes  CV: regular rate and rhythm, normal S1 S2, no S3 or S4, no murmur, click or rub, no peripheral edema  MS: no gross musculoskeletal defects noted, no edema  SKIN: no suspicious lesions or rashes  NEURO: Normal strength and tone, mentation intact and speech normal  PSYCH: mentation appears normal, affect normal/bright    No results found for any visits on 02/10/25.        Signed Electronically by: CHESTER Tristan CNP

## 2025-02-18 ENCOUNTER — TELEPHONE (OUTPATIENT)
Dept: FAMILY MEDICINE | Facility: CLINIC | Age: 40
End: 2025-02-18
Payer: COMMERCIAL

## 2025-02-18 DIAGNOSIS — F90.2 ATTENTION DEFICIT HYPERACTIVITY DISORDER (ADHD), COMBINED TYPE: ICD-10-CM

## 2025-02-18 RX ORDER — DEXTROAMPHETAMINE SACCHARATE, AMPHETAMINE ASPARTATE MONOHYDRATE, DEXTROAMPHETAMINE SULFATE AND AMPHETAMINE SULFATE 5; 5; 5; 5 MG/1; MG/1; MG/1; MG/1
20 CAPSULE, EXTENDED RELEASE ORAL DAILY
Qty: 30 CAPSULE | Refills: 0 | Status: CANCELLED | OUTPATIENT
Start: 2025-03-12

## 2025-02-18 NOTE — TELEPHONE ENCOUNTER
Pt was seen on 2/10/25 there was a new prescription that was prescribed   Amphetamine-Dextroamphetamine 20 MG Cp24, 20 mg Oral DAILY.  Nabila his wife called in and informed us that this still needs to be filled pharmacy never received the order.   Are we filling the amphetamine-dextroamphetamine (ADDERALL XR) 20 MG 24 hr capsule (Future)?    On 2/14 patient reported not taking amphetamine-dextroamphetamine (ADDERALL XR) 20 MG 24 hr capsule. Just wanted to clarify this before sending a request to pharmacy      Nabila Mcpherson/ Patient

## 2025-02-18 NOTE — TELEPHONE ENCOUNTER
"Spoke with Carol at [facility]. They have Evangelist's Adderall Rx on file but do not have the 20 mg generic tabs on stock. She says it has been \"trickling in\" so if Evangelist is not out he could wait a few days to see if they get a supply.   Contacted patient, Evangelist has been on Adderall before and was recently restarted. He does not require it daily and uses it for studying and taking tests. He can wait a few days to see if his pharmacy gets some in. If not, he will call around and let the care team know where else to send the Rx.  Radha CH RN    "

## 2025-06-19 ENCOUNTER — OFFICE VISIT (OUTPATIENT)
Dept: FAMILY MEDICINE | Facility: CLINIC | Age: 40
End: 2025-06-19
Payer: COMMERCIAL

## 2025-06-19 VITALS
BODY MASS INDEX: 27.98 KG/M2 | SYSTOLIC BLOOD PRESSURE: 124 MMHG | HEART RATE: 100 BPM | RESPIRATION RATE: 20 BRPM | TEMPERATURE: 97.8 F | WEIGHT: 218 LBS | HEIGHT: 74 IN | DIASTOLIC BLOOD PRESSURE: 84 MMHG | OXYGEN SATURATION: 98 %

## 2025-06-19 DIAGNOSIS — F90.2 ATTENTION DEFICIT HYPERACTIVITY DISORDER (ADHD), COMBINED TYPE: ICD-10-CM

## 2025-06-19 DIAGNOSIS — Z00.00 ROUTINE GENERAL MEDICAL EXAMINATION AT A HEALTH CARE FACILITY: Primary | ICD-10-CM

## 2025-06-19 DIAGNOSIS — F41.9 ANXIETY: ICD-10-CM

## 2025-06-19 RX ORDER — DEXTROAMPHETAMINE SACCHARATE, AMPHETAMINE ASPARTATE MONOHYDRATE, DEXTROAMPHETAMINE SULFATE AND AMPHETAMINE SULFATE 5; 5; 5; 5 MG/1; MG/1; MG/1; MG/1
CAPSULE, EXTENDED RELEASE ORAL
Refills: 0 | Status: CANCELLED | OUTPATIENT
Start: 2025-06-19

## 2025-06-19 RX ORDER — DEXTROAMPHETAMINE SACCHARATE, AMPHETAMINE ASPARTATE MONOHYDRATE, DEXTROAMPHETAMINE SULFATE AND AMPHETAMINE SULFATE 5; 5; 5; 5 MG/1; MG/1; MG/1; MG/1
20 CAPSULE, EXTENDED RELEASE ORAL DAILY
Qty: 30 CAPSULE | Refills: 0 | Status: SHIPPED | OUTPATIENT
Start: 2025-08-18 | End: 2025-09-17

## 2025-06-19 RX ORDER — DEXTROAMPHETAMINE SACCHARATE, AMPHETAMINE ASPARTATE MONOHYDRATE, DEXTROAMPHETAMINE SULFATE AND AMPHETAMINE SULFATE 5; 5; 5; 5 MG/1; MG/1; MG/1; MG/1
20 CAPSULE, EXTENDED RELEASE ORAL DAILY
Qty: 30 CAPSULE | Refills: 0 | Status: SHIPPED | OUTPATIENT
Start: 2025-06-19 | End: 2025-07-19

## 2025-06-19 RX ORDER — ALPRAZOLAM 0.25 MG
TABLET ORAL
Qty: 15 TABLET | Refills: 1 | Status: SHIPPED | OUTPATIENT
Start: 2025-06-19

## 2025-06-19 RX ORDER — DEXTROAMPHETAMINE SACCHARATE, AMPHETAMINE ASPARTATE MONOHYDRATE, DEXTROAMPHETAMINE SULFATE AND AMPHETAMINE SULFATE 5; 5; 5; 5 MG/1; MG/1; MG/1; MG/1
20 CAPSULE, EXTENDED RELEASE ORAL DAILY
Qty: 30 CAPSULE | Refills: 0 | Status: SHIPPED | OUTPATIENT
Start: 2025-07-19 | End: 2025-08-18

## 2025-06-19 RX ORDER — DEXTROAMPHETAMINE SACCHARATE, AMPHETAMINE ASPARTATE MONOHYDRATE, DEXTROAMPHETAMINE SULFATE AND AMPHETAMINE SULFATE 5; 5; 5; 5 MG/1; MG/1; MG/1; MG/1
CAPSULE, EXTENDED RELEASE ORAL
COMMUNITY
Start: 2025-05-17

## 2025-06-19 ASSESSMENT — ANXIETY QUESTIONNAIRES
1. FEELING NERVOUS, ANXIOUS, OR ON EDGE: SEVERAL DAYS
5. BEING SO RESTLESS THAT IT IS HARD TO SIT STILL: SEVERAL DAYS
IF YOU CHECKED OFF ANY PROBLEMS ON THIS QUESTIONNAIRE, HOW DIFFICULT HAVE THESE PROBLEMS MADE IT FOR YOU TO DO YOUR WORK, TAKE CARE OF THINGS AT HOME, OR GET ALONG WITH OTHER PEOPLE: NOT DIFFICULT AT ALL
3. WORRYING TOO MUCH ABOUT DIFFERENT THINGS: SEVERAL DAYS
GAD7 TOTAL SCORE: 5
4. TROUBLE RELAXING: SEVERAL DAYS
2. NOT BEING ABLE TO STOP OR CONTROL WORRYING: NOT AT ALL
6. BECOMING EASILY ANNOYED OR IRRITABLE: SEVERAL DAYS
7. FEELING AFRAID AS IF SOMETHING AWFUL MIGHT HAPPEN: NOT AT ALL
7. FEELING AFRAID AS IF SOMETHING AWFUL MIGHT HAPPEN: NOT AT ALL
GAD7 TOTAL SCORE: 5
8. IF YOU CHECKED OFF ANY PROBLEMS, HOW DIFFICULT HAVE THESE MADE IT FOR YOU TO DO YOUR WORK, TAKE CARE OF THINGS AT HOME, OR GET ALONG WITH OTHER PEOPLE?: NOT DIFFICULT AT ALL
GAD7 TOTAL SCORE: 5

## 2025-06-19 ASSESSMENT — PAIN SCALES - GENERAL: PAINLEVEL_OUTOF10: NO PAIN (0)

## 2025-06-19 ASSESSMENT — PATIENT HEALTH QUESTIONNAIRE - PHQ9
SUM OF ALL RESPONSES TO PHQ QUESTIONS 1-9: 4
10. IF YOU CHECKED OFF ANY PROBLEMS, HOW DIFFICULT HAVE THESE PROBLEMS MADE IT FOR YOU TO DO YOUR WORK, TAKE CARE OF THINGS AT HOME, OR GET ALONG WITH OTHER PEOPLE: NOT DIFFICULT AT ALL
SUM OF ALL RESPONSES TO PHQ QUESTIONS 1-9: 4

## 2025-06-19 NOTE — PROGRESS NOTES
Preventive Care Visit  [facility]  CHESTER Tristan CNP, Family Medicine  Jun 19, 2025      Assessment & Plan   Problem List Items Addressed This Visit          Behavioral Health    Anxiety    Relevant Medications    ALPRAZolam (XANAX) 0.25 MG tablet     Other Visit Diagnoses         Routine general medical examination at a health care facility    -  Primary      Attention deficit hyperactivity disorder (ADHD), combined type        Relevant Medications    amphetamine-dextroamphetamine (ADDERALL XR) 20 MG 24 hr capsule    amphetamine-dextroamphetamine (ADDERALL XR) 20 MG 24 hr capsule    amphetamine-dextroamphetamine (ADDERALL XR) 20 MG 24 hr capsule (Start on 7/19/2025)    amphetamine-dextroamphetamine (ADDERALL XR) 20 MG 24 hr capsule (Start on 8/18/2025)                Reviewed preventive health counseling, as reflected in patient instructions       Healthy diet/nutrition       Vision screening       Hearing screening    Follow-up    Follow-up Visit   Expected date:  Jun 19, 2026 (Approximate)      Follow Up Appointment Details:     Follow-up with whom?: PCP    Follow-Up for what?: Adult Preventive    How?: In Person                 Esperanza Blanca is a 39 year old, presenting for the following:  Physical        6/19/2025     3:55 PM   Additional Questions   Roomed by Fara          History of Present Illness       Mental Health Follow-up:  Patient presents to follow-up on Anxiety.    Patient's anxiety since last visit has been:  Good  The patient is not having other symptoms associated with anxiety.  Any significant life events: No  Patient is not feeling anxious or having panic attacks.  Patient has no concerns about alcohol or drug use.    He eats 0-1 servings of fruits and vegetables daily.He consumes 2 sweetened beverage(s) daily.He exercises with enough effort to increase his heart rate 10 to 19 minutes per day.  He exercises with enough effort to increase his heart rate 5  days per week. He is missing 3 dose(s) of medications per week.         Depression and Anxiety   How are you doing with your depression since your last visit? Improved   How are you doing with your anxiety since your last visit?  Improved   Are you having other symptoms that might be associated with depression or anxiety? No  Have you had a significant life event? No   Do you have any concerns with your use of alcohol or other drugs? No    Social History     Tobacco Use    Smoking status: Former     Current packs/day: 0.00     Average packs/day: 0.5 packs/day for 1 year (0.5 ttl pk-yrs)     Types: Cigarettes     Start date: 2012     Quit date: 2013     Years since quittin.0    Smokeless tobacco: Never   Vaping Use    Vaping status: Never Used   Substance Use Topics    Alcohol use: Yes     Comment: once in while    Drug use: No         1/15/2024     8:07 AM 2/10/2025     7:45 AM 2025     3:48 PM   PHQ   PHQ-9 Total Score 5 8  4    Q9: Thoughts of better off dead/self-harm past 2 weeks Not at all Not at all Not at all       Patient-reported         1/15/2024     8:08 AM 2/10/2025     7:48 AM 2025     3:49 PM   ITA-7 SCORE   Total Score 8 (mild anxiety) 6 (mild anxiety) 5 (mild anxiety)   Total Score 8 6  5        Patient-reported         2025     3:48 PM   Last PHQ-9   1.  Little interest or pleasure in doing things 0   2.  Feeling down, depressed, or hopeless 0   3.  Trouble falling or staying asleep, or sleeping too much 1   4.  Feeling tired or having little energy 1   5.  Poor appetite or overeating 0   6.  Feeling bad about yourself 0   7.  Trouble concentrating 2   8.  Moving slowly or restless 0   Q9: Thoughts of better off dead/self-harm past 2 weeks 0   PHQ-9 Total Score 4        Patient-reported         2025     3:49 PM   ITA-7    1. Feeling nervous, anxious, or on edge 1   2. Not being able to stop or control worrying 0   3. Worrying too much about different things 1   4.  Trouble relaxing 1   5. Being so restless that it is hard to sit still 1   6. Becoming easily annoyed or irritable 1   7. Feeling afraid, as if something awful might happen 0   ITA-7 Total Score 5    If you checked any problems, how difficult have they made it for you to do your work, take care of things at home, or get along with other people? Not difficult at all       Patient-reported       Suicide Assessment Five-step Evaluation and Treatment (SAFE-T)      Medication Followup of ADHD  Taking Medication as prescribed: yes  Side Effects:  None  Medication Helping Symptoms:  yes      Advance Care Planning             No data to display                   No data to display                   No data to display                  1/15/2024   Social Factors   Worry food won't last until get money to buy more No   Food not last or not have enough money for food? No   Do you have housing? (Housing is defined as stable permanent housing and does not include staying outside in a car, in a tent, in an abandoned building, in an overnight shelter, or couch-surfing.) Yes   Are you worried about losing your housing? No   Lack of transportation? No   Unable to get utilities (heat,electricity)? No          No data to display                Today's PHQ-9 Score:       2025     3:48 PM   PHQ-9 SCORE   PHQ-9 Total Score MyChart 4 (Minimal depression)   PHQ-9 Total Score 4        Patient-reported          No data to display              Social History     Tobacco Use    Smoking status: Former     Current packs/day: 0.00     Average packs/day: 0.5 packs/day for 1 year (0.5 ttl pk-yrs)     Types: Cigarettes     Start date: 2012     Quit date: 2013     Years since quittin.0    Smokeless tobacco: Never   Vaping Use    Vaping status: Never Used   Substance Use Topics    Alcohol use: Yes     Comment: once in while    Drug use: No              No data to display                 Reviewed and updated as needed this visit by  Provider                    BP Readings from Last 3 Encounters:   25 124/84   02/10/25 128/84   01/15/24 136/80    Wt Readings from Last 3 Encounters:   25 98.9 kg (218 lb)   25 102.5 kg (226 lb)   02/10/25 104.2 kg (229 lb 12.8 oz)                  Patient Active Problem List   Diagnosis    Tobacco use disorder    CARDIOVASCULAR SCREENING; LDL GOAL LESS THAN 160    Moderate Depression [296.32]    Anxiety    Attention deficit hyperactivity disorder (ADHD), predominantly inattentive type     Past Surgical History:   Procedure Laterality Date    SURGICAL HISTORY OF -   3/25/2004    Right Inguinal Herniorrhaphy with mesh    SURGICAL HISTORY OF -   1998    Open Appendectomy    SURGICAL HISTORY OF -   1998    Open Left inguinal Heerniorrhaphy.       Social History     Tobacco Use    Smoking status: Former     Current packs/day: 0.00     Average packs/day: 0.5 packs/day for 1 year (0.5 ttl pk-yrs)     Types: Cigarettes     Start date: 2012     Quit date: 2013     Years since quittin.0    Smokeless tobacco: Never   Substance Use Topics    Alcohol use: Yes     Comment: once in while     Family History   Problem Relation Age of Onset    Cancer Father         pancreatic    Snoring Father     Diabetes Brother          Current Outpatient Medications   Medication Sig Dispense Refill    allopurinol (ZYLOPRIM) 300 MG tablet Take 1 tablet (300 mg) by mouth daily. 90 tablet 3    ALPRAZolam (XANAX) 0.25 MG tablet TAKE ONE TABLET BY MOUTH THREE TIMES A DAY AS NEEDED FOR ANXIETY 15 tablet 0    amphetamine-dextroamphetamine (ADDERALL XR) 20 MG 24 hr capsule       escitalopram (LEXAPRO) 20 MG tablet Take 1 tablet (20 mg) by mouth daily. 90 tablet 3     No Known Allergies  Recent Labs   Lab Test 02/10/25  0824 01/15/24  0845 19  1538   * 111*  --    HDL 38* 43  --    TRIG 196* 343*  --    CR 1.05 0.83 0.91   GFRESTIMATED >90 >90 >90   GFRESTBLACK  --   --  >90   POTASSIUM 4.6 4.0 4.0  "         Review of Systems  Constitutional, HEENT, cardiovascular, pulmonary, gi and gu systems are negative, except as otherwise noted.     Objective    Exam  /84   Pulse 100   Temp 97.8  F (36.6  C) (Tympanic)   Resp 20   Ht 1.88 m (6' 2\")   Wt 98.9 kg (218 lb)   SpO2 98%   BMI 27.99 kg/m     Estimated body mass index is 27.99 kg/m  as calculated from the following:    Height as of this encounter: 1.88 m (6' 2\").    Weight as of this encounter: 98.9 kg (218 lb).    Physical Exam  GENERAL: alert and no distress  EYES: Eyes grossly normal to inspection, PERRL and conjunctivae and sclerae normal  HENT: ear canals and TM's normal, nose and mouth without ulcers or lesions  NECK: no adenopathy, no asymmetry, masses, or scars  RESP: lungs clear to auscultation - no rales, rhonchi or wheezes  CV: regular rate and rhythm, normal S1 S2, no S3 or S4, no murmur, click or rub, no peripheral edema  ABDOMEN: soft, nontender, no hepatosplenomegaly, no masses and bowel sounds normal  MS: no gross musculoskeletal defects noted, no edema  SKIN: no suspicious lesions or rashes  NEURO: Normal strength and tone, mentation intact and speech normal  PSYCH: mentation appears normal, affect normal/bright        Signed Electronically by: CHESTER Tristan CNP    "

## 2025-06-19 NOTE — PATIENT INSTRUCTIONS
Patient Education   Preventive Care Advice   This is general advice given by our system to help you stay healthy. However, your care team may have specific advice just for you. Please talk to your care team about your preventive care needs.  Nutrition  Eat 5 or more servings of fruits and vegetables each day.  Try wheat bread, brown rice and whole grain pasta (instead of white bread, rice, and pasta).  Get enough calcium and vitamin D. Check the label on foods and aim for 100% of the RDA (recommended daily allowance).  Lifestyle  Exercise at least 150 minutes each week  (30 minutes a day, 5 days a week).  Do muscle strengthening activities 2 days a week. These help control your weight and prevent disease.  No smoking.  Wear sunscreen to prevent skin cancer.  Have a dental exam and cleaning every 6 months.  Yearly exams  See your health care team every year to talk about:  Any changes in your health.  Any medicines your care team has prescribed.  Preventive care, family planning, and ways to prevent chronic diseases.  Shots (vaccines)   HPV shots (up to age 26), if you've never had them before.  Hepatitis B shots (up to age 59), if you've never had them before.  COVID-19 shot: Get this shot when it's due.  Flu shot: Get a flu shot every year.  Tetanus shot: Get a tetanus shot every 10 years.  Pneumococcal, hepatitis A, and RSV shots: Ask your care team if you need these based on your risk.  Shingles shot (for age 50 and up)  General health tests  Diabetes screening:  Starting at age 35, Get screened for diabetes at least every 3 years.  If you are younger than age 35, ask your care team if you should be screened for diabetes.  Cholesterol test: At age 39, start having a cholesterol test every 5 years, or more often if advised.  Bone density scan (DEXA): At age 50, ask your care team if you should have this scan for osteoporosis (brittle bones).  Hepatitis C: Get tested at least once in your life.  STIs (sexually  transmitted infections)  Before age 24: Ask your care team if you should be screened for STIs.  After age 24: Get screened for STIs if you're at risk. You are at risk for STIs (including HIV) if:  You are sexually active with more than one person.  You don't use condoms every time.  You or a partner was diagnosed with a sexually transmitted infection.  If you are at risk for HIV, ask about PrEP medicine to prevent HIV.  Get tested for HIV at least once in your life, whether you are at risk for HIV or not.  Cancer screening tests  Cervical cancer screening: If you have a cervix, begin getting regular cervical cancer screening tests starting at age 21.  Breast cancer scan (mammogram): If you've ever had breasts, begin having regular mammograms starting at age 40. This is a scan to check for breast cancer.  Colon cancer screening: It is important to start screening for colon cancer at age 45.  Have a colonoscopy test every 10 years (or more often if you're at risk) Or, ask your provider about stool tests like a FIT test every year or Cologuard test every 3 years.  To learn more about your testing options, visit:   .  For help making a decision, visit:   https://bit.ly/wq27703.  Prostate cancer screening test: If you have a prostate, ask your care team if a prostate cancer screening test (PSA) at age 55 is right for you.  Lung cancer screening: If you are a current or former smoker ages 50 to 80, ask your care team if ongoing lung cancer screenings are right for you.  For informational purposes only. Not to replace the advice of your health care provider. Copyright   2023 [employer]. All rights reserved. Clinically reviewed by the [facility] Transitions Program. [employer] [id number] - REV 01/24.